# Patient Record
Sex: FEMALE | Race: WHITE | Employment: OTHER | URBAN - METROPOLITAN AREA
[De-identification: names, ages, dates, MRNs, and addresses within clinical notes are randomized per-mention and may not be internally consistent; named-entity substitution may affect disease eponyms.]

---

## 2018-08-23 ENCOUNTER — HOSPITAL ENCOUNTER (EMERGENCY)
Age: 83
Discharge: HOME | End: 2018-08-23
Attending: STUDENT IN AN ORGANIZED HEALTH CARE EDUCATION/TRAINING PROGRAM
Payer: MEDICARE

## 2018-08-23 ENCOUNTER — APPOINTMENT (OUTPATIENT)
Dept: GENERAL RADIOLOGY | Age: 83
End: 2018-08-23
Attending: STUDENT IN AN ORGANIZED HEALTH CARE EDUCATION/TRAINING PROGRAM
Payer: MEDICARE

## 2018-08-23 VITALS
TEMPERATURE: 98 F | HEART RATE: 89 BPM | DIASTOLIC BLOOD PRESSURE: 62 MMHG | BODY MASS INDEX: 24.66 KG/M2 | SYSTOLIC BLOOD PRESSURE: 121 MMHG | RESPIRATION RATE: 16 BRPM | OXYGEN SATURATION: 98 % | WEIGHT: 134 LBS | HEIGHT: 62 IN

## 2018-08-23 DIAGNOSIS — M54.50 ACUTE MIDLINE LOW BACK PAIN WITHOUT SCIATICA: Primary | ICD-10-CM

## 2018-08-23 PROCEDURE — 72100 X-RAY EXAM L-S SPINE 2/3 VWS: CPT

## 2018-08-23 PROCEDURE — 99282 EMERGENCY DEPT VISIT SF MDM: CPT

## 2018-08-23 PROCEDURE — 73502 X-RAY EXAM HIP UNI 2-3 VIEWS: CPT

## 2018-08-23 RX ORDER — METHYLPREDNISOLONE 4 MG/1
TABLET ORAL
Qty: 1 DOSE PACK | Refills: 0 | Status: SHIPPED | OUTPATIENT
Start: 2018-08-23

## 2018-08-23 RX ORDER — IBUPROFEN 600 MG/1
600 TABLET ORAL
Qty: 20 TAB | Refills: 0 | Status: SHIPPED | OUTPATIENT
Start: 2018-08-23

## 2018-08-23 NOTE — ED TRIAGE NOTES
Triage note: Patient arrives with  c/o lower back pain beginning Sunday. Denies injury/trauma. [facility], prescribed medications, specifics unknown, patient reports completing regimen with reoccurring pain. Patient denies bowel or bladder incontinence. Denies numbness or tingling in LE.

## 2018-08-23 NOTE — DISCHARGE INSTRUCTIONS
Learning About How to Have a Healthy Back  What causes back pain? Back pain is often caused by overuse, strain, or injury. For example, people often hurt their backs playing sports or working in the yard, being jolted in a car accident, or lifting something too heavy. Aging plays a part too. Your bones and muscles tend to lose strength as you age, which makes injury more likely. The spongy discs between the bones of the spine (vertebrae) may suffer from wear and tear and no longer provide enough cushion between the bones. A disc that bulges or breaks open (herniated disc) can press on nerves, causing back pain. In some people, back pain is the result of arthritis, broken vertebrae caused by bone loss (osteoporosis), illness, or a spine problem. Although most people have back pain at one time or another, there are steps you can take to make it less likely. How can you have a healthy back? Reduce stress on your back through good posture  Slumping or slouching alone may not cause low back pain. But after the back has been strained or injured, bad posture can make pain worse. · Sleep in a position that maintains your back's normal curves and on a mattress that feels comfortable. Sleep on your side with a pillow between your knees, or sleep on your back with a pillow under your knees. These positions can reduce strain on your back. · Stand and sit up straight. \"Good posture\" generally means your ears, shoulders, and hips are in a straight line. · If you must stand for a long time, put one foot on a stool, ledge, or box. Switch feet every now and then. · Sit in a chair that is low enough to let you place both feet flat on the floor with both knees nearly level with your hips. If your chair or desk is too high, use a footrest to raise your knees. Place a small pillow, a rolled-up towel, or a lumbar roll in the curve of your back if you need extra support.   · Try a kneeling chair, which helps tilt your hips forward. This takes pressure off your lower back. · Try sitting on an exercise ball. It can rock from side to side, which helps keep your back loose. · When driving, keep your knees nearly level with your hips. Sit straight, and drive with both hands on the steering wheel. Your arms should be in a slightly bent position. Reduce stress on your back through careful lifting  · Squat down, bending at the hips and knees only. If you need to, put one knee to the floor and extend your other knee in front of you, bent at a right angle (half kneeling). · Press your chest straight forward. This helps keep your upper back straight while keeping a slight arch in your low back. · Hold the load as close to your body as possible, at the level of your belly button (navel). · Use your feet to change direction, taking small steps. · Lead with your hips as you change direction. Keep your shoulders in line with your hips as you move. · Set down your load carefully, squatting with your knees and hips only. Exercise and stretch your back  · Do some exercise on most days of the week, if your doctor says it is okay. You can walk, run, swim, or cycle. · Stretch your back muscles. Here are a few exercises to try:  Denisa Gal on your back, and gently pull one bent knee to your chest. Put that foot back on the floor, and then pull the other knee to your chest.  ¨ Do pelvic tilts. Lie on your back with your knees bent. Tighten your stomach muscles. Pull your belly button (navel) in and up toward your ribs. You should feel like your back is pressing to the floor and your hips and pelvis are slightly lifting off the floor. Hold for 6 seconds while breathing smoothly. ¨ Sit with your back flat against a wall. · Keep your core muscles strong. The muscles of your back, belly (abdomen), and buttocks support your spine. ¨ Pull in your belly and imagine pulling your navel toward your spine. Hold this for 6 seconds, then relax.  Remember to keep breathing normally as you tense your muscles. ¨ Do curl-ups. Always do them with your knees bent. Keep your low back on the floor, and curl your shoulders toward your knees using a smooth, slow motion. Keep your arms folded across your chest. If this bothers your neck, try putting your hands behind your neck (not your head), with your elbows spread apart. ¨ Lie on your back with your knees bent and your feet flat on the floor. Tighten your belly muscles, and then push with your feet and raise your buttocks up a few inches. Hold this position 6 seconds as you continue to breathe normally, then lower yourself slowly to the floor. Repeat 8 to 12 times. ¨ If you like group exercise, try Pilates or yoga. These classes have poses that strengthen the core muscles. Lead a healthy lifestyle  · Stay at a healthy weight to avoid strain on your back. · Do not smoke. Smoking increases the risk of osteoporosis, which weakens the spine. If you need help quitting, talk to your doctor about stop-smoking programs and medicines. These can increase your chances of quitting for good. Where can you learn more? Go to http://karlene-jarod.info/. Enter L315 in the search box to learn more about \"Learning About How to Have a Healthy Back. \"  Current as of: November 29, 2017  Content Version: 11.7  © 5122-7265 [employer]. Care instructions adapted under license by [employer] (which disclaims liability or warranty for this information). If you have questions about a medical condition or this instruction, always ask your healthcare professional. [place] [address] any warranty or liability for your use of this information.

## 2018-08-23 NOTE — ED PROVIDER NOTES
HPI Comments: 81 y.o. female with past medical history significant for CVA, diabetes who presents with chief complaint of lower back pain. Patient reports 4 days of lower back pain that started after falling backward in the bathtub. Patient [facility] ago, had no imaging, and was prescribed \"pain pills\" without relief. Patient states pain is worse with movement and walking; she states she is unable to ambulate due to pain. Patient has no prior history of similar pain. No lower extremity weakness or numbness. No incontinence. There are no other acute medical concerns at this time. Social hx: Nonsmoker, no EtOH, no illicit drug use. PCP: Teri Larry MD    Note written by Erin Miranda, as dictated by Valerie Nj MD 9:09 AM      The history is provided by the patient. Past Medical History:   Diagnosis Date    [facility] ([facility]     Hypercholesteremia     Hypertension     [facility]       Past Surgical History:   Procedure Laterality Date    HX CHOLECYSTECTOMY           Family History:   Problem Relation Age of Onset    Arthritis-osteo Father        Social History     Social History    Marital status:      Spouse name: N/A    Number of children: N/A    Years of education: N/A     Occupational History    Not on file. Social History Main Topics    Smoking status: Never Smoker    Smokeless tobacco: Never Used    Alcohol use No    Drug use: No    Sexual activity: Not on file     Other Topics Concern    Not on file     Social History Narrative         ALLERGIES: Pcn [penicillins]    Review of Systems   Constitutional: Negative for activity change, diaphoresis, fatigue and fever. HENT: Negative for congestion and sore throat. Eyes: Negative for photophobia and visual disturbance. Respiratory: Negative for chest tightness and shortness of breath.     Cardiovascular: Negative for chest pain, palpitations and leg swelling. Gastrointestinal: Negative for abdominal pain, blood in stool, constipation, diarrhea, nausea and vomiting. Genitourinary: Negative for difficulty urinating, dysuria, flank pain, frequency and hematuria. Musculoskeletal: Positive for back pain. Neurological: Negative for dizziness, syncope, numbness and headaches. All other systems reviewed and are negative. Vitals:    08/23/18 0844   BP: 129/64   Pulse: 94   Resp: 18   Temp: 97.8 °F (36.6 °C)   SpO2: 98%   Weight: 60.8 kg (134 lb)   Height: 5' 2\" (1.575 m)            Physical Exam   Constitutional: She is oriented to person, place, and time. She appears well-developed and well-nourished. No distress. HENT:   Head: Normocephalic and atraumatic. Nose: Nose normal.   Mouth/Throat: Oropharynx is clear and moist. No oropharyngeal exudate. Eyes: Conjunctivae and EOM are normal. Right eye exhibits no discharge. Left eye exhibits no discharge. No scleral icterus. Neck: Normal range of motion. Neck supple. No JVD present. No tracheal deviation present. No thyromegaly present. Cardiovascular: Normal rate, regular rhythm, normal heart sounds and intact distal pulses. Exam reveals no gallop and no friction rub. No murmur heard. Pulmonary/Chest: Effort normal and breath sounds normal. No stridor. No respiratory distress. She has no wheezes. She has no rales. She exhibits no tenderness. Abdominal: Bowel sounds are normal. She exhibits no distension and no mass. There is no tenderness. There is no rebound. Musculoskeletal: Normal range of motion. She exhibits tenderness (sacral tenderness). She exhibits no edema. Lymphadenopathy:     She has no cervical adenopathy. Neurological: She is alert and oriented to person, place, and time. No cranial nerve deficit. Coordination normal.   Skin: Skin is warm and dry. No rash noted. She is not diaphoretic. No erythema. No pallor. Psychiatric: She has a normal mood and affect.  Her behavior is normal. Judgment and thought content normal.   Nursing note and vitals reviewed. Note written by Scott Choi. Masoud Edwards, as dictated by Michelle Siddiqui MD 9:13 AM       MDM  Number of Diagnoses or Management Options  Acute midline low back pain without sciatica:   Diagnosis management comments: Pelvic fracture, lumbar fracture, lumbar strain, sacral contusion. 7year-old female presenting after having a fall in her bathtub striking her lower back and sacral region no obvious deformity or ecchymosis noted tender to touch. Patient was seen and evaluated at [facility] states that imaging was not obtained. Plan: X-ray lumbar, x-ray pelvis patient currently pain-free while supine. Reassessment:  Pelvis unremarkable lumbar spine shows disc space narrowing at L3-L4 L4-L5 and L5-S1 with posterior facet arthrosis negative for fracture. Will place patient on Medrol Dosepak and NSAIDs with PCP and/or ortho follow up. Amount and/or Complexity of Data Reviewed  Tests in the radiology section of CPT®: ordered and reviewed  Review and summarize past medical records: yes  Independent visualization of images, tracings, or specimens: yes    Risk of Complications, Morbidity, and/or Mortality  Presenting problems: moderate  Diagnostic procedures: moderate  Management options: moderate    Patient Progress  Patient progress: stable        ED Course       Procedures    10:40 AM  Plan to discharge on Motrin and Medrol dosepack with PCP follow up.    7:22 AM  The patient has been reevaluated. The patient is ready for discharge. The patient's signs, symptoms, diagnosis, and discharge instructions have been discussed and the patient/ family has conveyed their understanding. The patient is to follow up as recommended or return to the ED should their symptoms worsen. Plan has been discussed and the patient is in agreement.     LABORATORY TESTS:  No results found for this or any previous visit (from the past 12 hour(s)). IMAGING RESULTS:  XR HIP RT W OR WO PELV 2-3 VWS   Final Result      XR SPINE LUMB 2 OR 3 V   Final Result        Xr Spine Lumb 2 Or 3 V    Result Date: 8/23/2018  EXAM:  XR SPINE LUMB 2 OR 3 V INDICATION: Evaluate for back pain. COMPARISON: None. FINDINGS: AP, lateral and spot lateral views of the lumbar spine demonstrate levoconvex scoliosis with 6 mm of anterolisthesis at L4-L5. There is disc space narrowing at L3-L4, L4-L5 and L5-S1 with posterior facet arthrosis. The vertebral body heights are preserved. There is no fracture or other acute abnormality. IMPRESSION: Levoconvex lumbar scoliosis and spondylosis with grade 1 L4-L5 anterolisthesis. Xr Hip Rt W Or Wo Pelv 2-3 Vws    Result Date: 8/23/2018  EXAM:  XR HIP RT W OR WO PELV 2-3 VWS INDICATION: Right hip pain after fall. COMPARISON: None. FINDINGS: An AP view of the pelvis and a frogleg lateral view of the right hip demonstrate no fracture, dislocation or other acute abnormality. There is degenerative change of the lumbar spine. There are vascular calcifications. The frog-leg view is suboptimal.     IMPRESSION: Normal right hip. MEDICATIONS GIVEN:  Medications - No data to display    IMPRESSION:  1. Acute midline low back pain without sciatica        PLAN:  1.    Discharge Medication List as of 8/23/2018 10:39 AM      START taking these medications    Details   ibuprofen (MOTRIN) 600 mg tablet Take 1 Tab by mouth every six (6) hours as needed for Pain., Print, Disp-20 Tab, R-0      methylPREDNISolone (MEDROL DOSEPACK) 4 mg tablet Take per package insert, Print, Disp-1 Dose Pack, R-0         CONTINUE these medications which have NOT CHANGED    Details   pantoprazole (PROTONIX) 40 mg tablet TAKE ONE TABLET BY MOUTH EVERY DAY, Print, Disp-30 Tab, R-11      !! metFORMIN (GLUCOPHAGE) 1,000 mg tablet TAKE ONE TABLET BY MOUTH TWICE DAILY - DUE FOR FASTING OFFICE VISIT, Print, Disp-60 Tab, R-0      guaiFENesin (ROBITUSSIN) 100 mg/5 mL liquid Take 10 mL by mouth every four (4) hours as needed for Cough. Normal, 200 mg, Disp-1 Bottle, R-0      ferrous sulfate 325 mg (65 mg iron) tablet Take 1 Tab by mouth Daily (before breakfast). Normal, 325 mg, Disp-30 Tab, R-0      polyethylene glycol (MIRALAX) 17 gram packet Take 1 Packet by mouth as needed. Normal, 17 g, Disp-15 Each, R-0      oxyCODONE-acetaminophen (PERCOCET) 5-325 mg per tablet Take 1 Tab by mouth every six (6) hours as needed for Pain. Print, 1 Tab, Disp-20 Tab, R-0      carvedilol (COREG) 6.25 mg tablet Take 1 Tab by mouth every twelve (12) hours. Print, 6.25 mg, Disp-30 Tab, R-0      diltiazem CD (CARDIZEM CD) 120 mg ER capsule Take 1 Cap by mouth daily. Print, 120 mg, Disp-30 Cap, R-0      lisinopril (PRINIVIL, ZESTRIL) 10 mg tablet Take 10 mg by mouth daily. Historical Med, 10 mg      simvastatin (ZOCOR) 40 mg tablet Take 1 Tab by mouth nightly. Normal, 40 mg, Disp-30 Tab, R-3      glipiZIDE (GLUCOTROL) 5 mg tablet TAKE ONE TABLET BY MOUTH TWICE DAILYNormal, Disp-180 Tab, R-0      !! metFORMIN (GLUCOPHAGE) 1,000 mg tablet Take 1 Tab by mouth two (2) times daily (with meals). Normal, 1,000 mg, Disp-60 Tab, R-6      multivitamins-minerals-lutein (CENTRUM SILVER) Tab Take  by mouth. Historical Med      aspirin delayed-release 81 mg tablet Take  by mouth daily. Historical Med       !! - Potential duplicate medications found. Please discuss with provider.         2.   Follow-up Information     Follow up With Details Comments Contact Info    Tez Loza MD  If symptoms worsen [address] [place] [id number] [phone]      [facility] [place] [facility] [address]  [phone]            Return to ED for new or worsening symptoms       Mohit Anthony MD

## 2019-07-29 ENCOUNTER — HOSPITAL ENCOUNTER (EMERGENCY)
Age: 84
Discharge: HOME | End: 2019-07-29
Attending: STUDENT IN AN ORGANIZED HEALTH CARE EDUCATION/TRAINING PROGRAM
Payer: MEDICARE

## 2019-07-29 VITALS
HEART RATE: 89 BPM | RESPIRATION RATE: 16 BRPM | BODY MASS INDEX: 24.51 KG/M2 | OXYGEN SATURATION: 100 % | HEIGHT: 62 IN | SYSTOLIC BLOOD PRESSURE: 151 MMHG | DIASTOLIC BLOOD PRESSURE: 66 MMHG | TEMPERATURE: 97.5 F

## 2019-07-29 DIAGNOSIS — R10.13 ABDOMINAL PAIN, EPIGASTRIC: ICD-10-CM

## 2019-07-29 DIAGNOSIS — R19.7 NAUSEA VOMITING AND DIARRHEA: Primary | ICD-10-CM

## 2019-07-29 DIAGNOSIS — R11.2 NAUSEA VOMITING AND DIARRHEA: Primary | ICD-10-CM

## 2019-07-29 LAB
ALBUMIN SERPL-MCNC: 3.9 G/DL (ref 3.5–5)
ALBUMIN/GLOB SERPL: 1 {RATIO} (ref 1.1–2.2)
ALP SERPL-CCNC: 118 U/L (ref 45–117)
ALT SERPL-CCNC: 13 U/L (ref 12–78)
ANION GAP SERPL CALC-SCNC: 7 MMOL/L (ref 5–15)
AST SERPL-CCNC: 17 U/L (ref 15–37)
BASOPHILS # BLD: 0 K/UL (ref 0–0.1)
BASOPHILS NFR BLD: 1 % (ref 0–1)
BILIRUB SERPL-MCNC: 0.3 MG/DL (ref 0.2–1)
BUN SERPL-MCNC: 15 MG/DL (ref 6–20)
BUN/CREAT SERPL: 21 (ref 12–20)
CALCIUM SERPL-MCNC: 9.8 MG/DL (ref 8.5–10.1)
CHLORIDE SERPL-SCNC: 104 MMOL/L (ref 97–108)
CO2 SERPL-SCNC: 28 MMOL/L (ref 21–32)
COMMENT, HOLDF: NORMAL
CREAT SERPL-MCNC: 0.71 MG/DL (ref 0.55–1.02)
DIFFERENTIAL METHOD BLD: ABNORMAL
EOSINOPHIL # BLD: 0.2 K/UL (ref 0–0.4)
EOSINOPHIL NFR BLD: 2 % (ref 0–7)
ERYTHROCYTE [DISTWIDTH] IN BLOOD BY AUTOMATED COUNT: 14.7 % (ref 11.5–14.5)
GLOBULIN SER CALC-MCNC: 3.8 G/DL (ref 2–4)
GLUCOSE SERPL-MCNC: 146 MG/DL (ref 65–100)
HCT VFR BLD AUTO: 42 % (ref 35–47)
HGB BLD-MCNC: 12.3 G/DL (ref 11.5–16)
IMM GRANULOCYTES # BLD AUTO: 0 K/UL (ref 0–0.04)
IMM GRANULOCYTES NFR BLD AUTO: 0 % (ref 0–0.5)
LIPASE SERPL-CCNC: 61 U/L (ref 73–393)
LYMPHOCYTES # BLD: 1.6 K/UL (ref 0.8–3.5)
LYMPHOCYTES NFR BLD: 21 % (ref 12–49)
MAGNESIUM SERPL-MCNC: 1.6 MG/DL (ref 1.6–2.4)
MCH RBC QN AUTO: 25.7 PG (ref 26–34)
MCHC RBC AUTO-ENTMCNC: 29.3 G/DL (ref 30–36.5)
MCV RBC AUTO: 87.9 FL (ref 80–99)
MONOCYTES # BLD: 0.5 K/UL (ref 0–1)
MONOCYTES NFR BLD: 7 % (ref 5–13)
NEUTS SEG # BLD: 5.4 K/UL (ref 1.8–8)
NEUTS SEG NFR BLD: 69 % (ref 32–75)
NRBC # BLD: 0 K/UL (ref 0–0.01)
NRBC BLD-RTO: 0 PER 100 WBC
PLATELET # BLD AUTO: 264 K/UL (ref 150–400)
PMV BLD AUTO: 10.2 FL (ref 8.9–12.9)
POTASSIUM SERPL-SCNC: 3.7 MMOL/L (ref 3.5–5.1)
PROT SERPL-MCNC: 7.7 G/DL (ref 6.4–8.2)
RBC # BLD AUTO: 4.78 M/UL (ref 3.8–5.2)
SAMPLES BEING HELD,HOLD: NORMAL
SODIUM SERPL-SCNC: 139 MMOL/L (ref 136–145)
WBC # BLD AUTO: 7.8 K/UL (ref 3.6–11)

## 2019-07-29 PROCEDURE — 99284 EMERGENCY DEPT VISIT MOD MDM: CPT

## 2019-07-29 PROCEDURE — 96361 HYDRATE IV INFUSION ADD-ON: CPT

## 2019-07-29 PROCEDURE — 83690 ASSAY OF LIPASE: CPT

## 2019-07-29 PROCEDURE — 36415 COLL VENOUS BLD VENIPUNCTURE: CPT

## 2019-07-29 PROCEDURE — 96374 THER/PROPH/DIAG INJ IV PUSH: CPT

## 2019-07-29 PROCEDURE — 80053 COMPREHEN METABOLIC PANEL: CPT

## 2019-07-29 PROCEDURE — 93005 ELECTROCARDIOGRAM TRACING: CPT

## 2019-07-29 PROCEDURE — 74011000250 HC RX REV CODE- 250: Performed by: STUDENT IN AN ORGANIZED HEALTH CARE EDUCATION/TRAINING PROGRAM

## 2019-07-29 PROCEDURE — 96375 TX/PRO/DX INJ NEW DRUG ADDON: CPT

## 2019-07-29 PROCEDURE — 85025 COMPLETE CBC W/AUTO DIFF WBC: CPT

## 2019-07-29 PROCEDURE — 74011250636 HC RX REV CODE- 250/636: Performed by: STUDENT IN AN ORGANIZED HEALTH CARE EDUCATION/TRAINING PROGRAM

## 2019-07-29 PROCEDURE — 83735 ASSAY OF MAGNESIUM: CPT

## 2019-07-29 RX ORDER — SODIUM CHLORIDE 9 MG/ML
1000 INJECTION, SOLUTION INTRAVENOUS ONCE
Status: COMPLETED | OUTPATIENT
Start: 2019-07-29 | End: 2019-07-29

## 2019-07-29 RX ORDER — ONDANSETRON 2 MG/ML
4 INJECTION INTRAMUSCULAR; INTRAVENOUS
Status: COMPLETED | OUTPATIENT
Start: 2019-07-29 | End: 2019-07-29

## 2019-07-29 RX ORDER — ONDANSETRON 4 MG/1
4 TABLET, FILM COATED ORAL
Qty: 8 TAB | Refills: 0 | Status: SHIPPED | OUTPATIENT
Start: 2019-07-29

## 2019-07-29 RX ADMIN — FAMOTIDINE 20 MG: 10 INJECTION, SOLUTION INTRAVENOUS at 17:26

## 2019-07-29 RX ADMIN — ONDANSETRON 4 MG: 2 INJECTION INTRAMUSCULAR; INTRAVENOUS at 17:26

## 2019-07-29 RX ADMIN — SODIUM CHLORIDE 1000 ML: 900 INJECTION, SOLUTION INTRAVENOUS at 17:26

## 2019-07-29 NOTE — ED PROVIDER NOTES
The patient is an 78-year-old female with a prior history of type 1 diabetes, hyperlipidemia, hypertension and prior stroke as well as cholecystectomy presenting to the emergency department today secondary to nausea, vomiting, and diarrhea. Patient reports 4 days of symptoms that started after eating at a seafood restaurant. .  She has had 4 episodes of vomiting per day as well as to 3 episodes of diarrhea per day. She describes the emesis as \"hot water\" with small specks of blood in at times. The diarrhea has been watery and without blood. She describes an aching epigastric pain that does not radiate. No lower abdominal pain. Normal urine output without hematuria, dysuria or frequency. No back pain or headache. No fever chills. No chest pain. She gets some dizziness and shortness of breath when walking. She lives with her  who has not been ill. No recent travel outside of the country. No pets at home. Social: Denies tobacco,  drug or alcohol use. Past Medical History:   Diagnosis Date    [facility] ([facility]     Hypercholesteremia     Hypertension     Stroke ([facility] 3/6/2011       Past Surgical History:   Procedure Laterality Date    HX CHOLECYSTECTOMY           Family History:   Problem Relation Age of Onset    Arthritis-osteo Father          ALLERGIES: Pcn [penicillins]    Review of Systems   Constitutional: Negative for chills, fatigue and fever. HENT: Negative for congestion and rhinorrhea. Eyes: Negative for pain, redness and visual disturbance. Respiratory: Positive for shortness of breath. Negative for cough. Cardiovascular: Negative for chest pain and leg swelling. Gastrointestinal: Positive for abdominal pain, diarrhea, nausea and vomiting. Genitourinary: Negative for dysuria, flank pain, frequency, hematuria and urgency. Musculoskeletal: Negative for arthralgias, back pain, myalgias and neck pain. Skin: Negative for rash and wound. Allergic/Immunologic: Negative for immunocompromised state. Neurological: Positive for dizziness. Negative for headaches. Vitals:    07/29/19 1604   Pulse: (!) 114   SpO2: 97%            Physical Exam   Constitutional: She is oriented to person, place, and time. She appears well-developed and well-nourished. No distress. HENT:   Head: Normocephalic. Mouth/Throat: No oropharyngeal exudate. Dry mucous membranes   Eyes: Pupils are equal, round, and reactive to light. EOM are normal. Right eye exhibits no discharge. Left eye exhibits no discharge. Neck: Normal range of motion. Neck supple. Cardiovascular: Regular rhythm, normal heart sounds and intact distal pulses. Exam reveals no gallop and no friction rub. No murmur heard. tachycardic   Pulmonary/Chest: Effort normal and breath sounds normal. No stridor. No respiratory distress. She has no wheezes. She has no rales. Abdominal: Soft. Bowel sounds are normal. She exhibits no distension. There is tenderness (mild in epigastrium). There is no rebound and no guarding. Musculoskeletal: Normal range of motion. She exhibits no edema or deformity. Neurological: She is alert and oriented to person, place, and time. Skin: Skin is warm and dry. Capillary refill takes less than 2 seconds. No rash noted. She is not diaphoretic. Psychiatric: She has a normal mood and affect. Her behavior is normal.   Nursing note and vitals reviewed. EKG Interpretation:   NSR rate of 79, normal axis, slightly prolonged QTc, non specific STT wave changes       Labs Reviewed:   No leukocytosis or anemia  Normal sodium, potassium  Mild hyperglycemia at 146  Her creatinine is 0.71 which is up from baseline of 0.2-0.3 (six years ago)  LFT not c/w biliary obstructive process or hepatitis  Lipase not c/w pancreatitis  Magnesium WNL      Course:  Zofran 4mg IV + Pepcid 20mg IV + 1L NSS IV given    6:07 PM re-evaluated. Patient feeling much better. HR improved to 78. No acute distress. Abdominal exam without tenderness, no c/o nausea or pain at this time. Will continue with IVF and re-assess with PO challenge after. 7:15 PM patient was re-evaluated. Abdominal exam without any tenderness. VS stable. Feeling well and wanting to go home. Tolerated water and crackers. MDM:  78-year-old female here with 4 days of nausea, vomiting, diarrhea and epigastric discomfort. On arrival she was tachycardic which improved with 1 L of IV fluids. Her abdominal exam was initially with some tenderness in the epigastrium but improved with Pepcid and Zofran. On serial reexaminations she had no tenderness. No vomiting or diarrhea while in the department. Overall she is very well-appearing in no acute distress. She was able to take p.o. in the emergency department. Labs not consistent with pancreatitis, cholecystitis, hepatitis, or significant electrolyte abnormality. I am not consistent with appendicitis or small bowel obstruction. History not consistent with mesenteric ischemia. EKG showed no ischemic changes. Given her overall well appearance and ability to tolerate p.o. I feel she is stable for discharge home. Symptoms could be related to food poisoning versus gastroenteritis. Clinical Impression:     ICD-10-CM ICD-9-CM    1. Nausea vomiting and diarrhea R11.2 787.91     R19.7 787.01    2.  Abdominal pain, epigastric R10.13 789.06            Disposition: DC home    [address] [facility] DO Elizabeth

## 2019-07-29 NOTE — ED NOTES
Discharge instructions reviewed by provider; pt verbalized understanding of discharge and medication use. Vitals updated, IV DC'ed and pt wheeled from ED to family car. No apparent distress noted.

## 2019-07-29 NOTE — DISCHARGE INSTRUCTIONS
PLEASE RETURN IF YOU DEVELOP WORSENING ABDOMINAL PAIN OR IF IT MOVES TO YOUR LOWER ABDOMEN    RETURN IF YOU ARE UNABLE TO TOLERATE ANYTHING BY MOUTH    TAKE THE ZOFRAN AS NEEDED EVERY 8 HOURS FOR NAUSEA

## 2019-07-30 LAB
ATRIAL RATE: 79 BPM
CALCULATED P AXIS, ECG09: 30 DEGREES
CALCULATED R AXIS, ECG10: 50 DEGREES
CALCULATED T AXIS, ECG11: 11 DEGREES
DIAGNOSIS, 93000: NORMAL
P-R INTERVAL, ECG05: 174 MS
Q-T INTERVAL, ECG07: 412 MS
QRS DURATION, ECG06: 84 MS
QTC CALCULATION (BEZET), ECG08: 472 MS
VENTRICULAR RATE, ECG03: 79 BPM

## 2022-11-09 ENCOUNTER — HOSPITAL ENCOUNTER (EMERGENCY)
Age: 87
Discharge: HOME | End: 2022-11-09
Attending: EMERGENCY MEDICINE
Payer: MEDICARE

## 2022-11-09 ENCOUNTER — APPOINTMENT (OUTPATIENT)
Dept: CT IMAGING | Age: 87
End: 2022-11-09
Attending: STUDENT IN AN ORGANIZED HEALTH CARE EDUCATION/TRAINING PROGRAM
Payer: MEDICARE

## 2022-11-09 VITALS
DIASTOLIC BLOOD PRESSURE: 81 MMHG | OXYGEN SATURATION: 98 % | TEMPERATURE: 98.1 F | HEART RATE: 77 BPM | SYSTOLIC BLOOD PRESSURE: 174 MMHG | RESPIRATION RATE: 20 BRPM

## 2022-11-09 DIAGNOSIS — R10.13 ABDOMINAL PAIN, EPIGASTRIC: Primary | ICD-10-CM

## 2022-11-09 DIAGNOSIS — K59.00 CONSTIPATION, UNSPECIFIED CONSTIPATION TYPE: ICD-10-CM

## 2022-11-09 LAB
ALBUMIN SERPL-MCNC: 3.6 G/DL (ref 3.5–5)
ALBUMIN/GLOB SERPL: 0.9 {RATIO} (ref 1.1–2.2)
ALP SERPL-CCNC: 120 U/L (ref 45–117)
ALT SERPL-CCNC: 17 U/L (ref 12–78)
ANION GAP SERPL CALC-SCNC: 5 MMOL/L (ref 5–15)
AST SERPL-CCNC: ABNORMAL U/L (ref 15–37)
BASOPHILS # BLD: 0 K/UL (ref 0–0.1)
BASOPHILS NFR BLD: 0 % (ref 0–1)
BILIRUB SERPL-MCNC: 0.5 MG/DL (ref 0.2–1)
BUN SERPL-MCNC: 11 MG/DL (ref 6–20)
BUN/CREAT SERPL: 17 (ref 12–20)
CALCIUM SERPL-MCNC: 9.7 MG/DL (ref 8.5–10.1)
CHLORIDE SERPL-SCNC: 106 MMOL/L (ref 97–108)
CO2 SERPL-SCNC: 26 MMOL/L (ref 21–32)
COMMENT, HOLDF: NORMAL
CREAT SERPL-MCNC: 0.66 MG/DL (ref 0.55–1.02)
DIFFERENTIAL METHOD BLD: NORMAL
EOSINOPHIL # BLD: 0.1 K/UL (ref 0–0.4)
EOSINOPHIL NFR BLD: 2 % (ref 0–7)
ERYTHROCYTE [DISTWIDTH] IN BLOOD BY AUTOMATED COUNT: 14.1 % (ref 11.5–14.5)
GLOBULIN SER CALC-MCNC: 4.1 G/DL (ref 2–4)
GLUCOSE SERPL-MCNC: 163 MG/DL (ref 65–100)
HCT VFR BLD AUTO: 45.6 % (ref 35–47)
HGB BLD-MCNC: 14.5 G/DL (ref 11.5–16)
IMM GRANULOCYTES # BLD AUTO: 0 K/UL (ref 0–0.04)
IMM GRANULOCYTES NFR BLD AUTO: 0 % (ref 0–0.5)
LIPASE SERPL-CCNC: 42 U/L (ref 73–393)
LYMPHOCYTES # BLD: 1.3 K/UL (ref 0.8–3.5)
LYMPHOCYTES NFR BLD: 18 % (ref 12–49)
MCH RBC QN AUTO: 28.3 PG (ref 26–34)
MCHC RBC AUTO-ENTMCNC: 31.8 G/DL (ref 30–36.5)
MCV RBC AUTO: 88.9 FL (ref 80–99)
MONOCYTES # BLD: 0.5 K/UL (ref 0–1)
MONOCYTES NFR BLD: 8 % (ref 5–13)
NEUTS SEG # BLD: 5 K/UL (ref 1.8–8)
NEUTS SEG NFR BLD: 72 % (ref 32–75)
NRBC # BLD: 0 K/UL (ref 0–0.01)
NRBC BLD-RTO: 0 PER 100 WBC
PLATELET # BLD AUTO: 231 K/UL (ref 150–400)
PMV BLD AUTO: 10.9 FL (ref 8.9–12.9)
POTASSIUM SERPL-SCNC: ABNORMAL MMOL/L (ref 3.5–5.1)
PROT SERPL-MCNC: 7.7 G/DL (ref 6.4–8.2)
RBC # BLD AUTO: 5.13 M/UL (ref 3.8–5.2)
SAMPLES BEING HELD,HOLD: NORMAL
SODIUM SERPL-SCNC: 137 MMOL/L (ref 136–145)
WBC # BLD AUTO: 6.9 K/UL (ref 3.6–11)

## 2022-11-09 PROCEDURE — 83690 ASSAY OF LIPASE: CPT

## 2022-11-09 PROCEDURE — 80053 COMPREHEN METABOLIC PANEL: CPT

## 2022-11-09 PROCEDURE — 36415 COLL VENOUS BLD VENIPUNCTURE: CPT

## 2022-11-09 PROCEDURE — 85025 COMPLETE CBC W/AUTO DIFF WBC: CPT

## 2022-11-09 PROCEDURE — 74177 CT ABD & PELVIS W/CONTRAST: CPT

## 2022-11-09 PROCEDURE — 99285 EMERGENCY DEPT VISIT HI MDM: CPT

## 2022-11-09 PROCEDURE — 74011000636 HC RX REV CODE- 636: Performed by: RADIOLOGY

## 2022-11-09 PROCEDURE — 93005 ELECTROCARDIOGRAM TRACING: CPT

## 2022-11-09 RX ADMIN — IOPAMIDOL 100 ML: 755 INJECTION, SOLUTION INTRAVENOUS at 18:08

## 2022-11-09 NOTE — ED TRIAGE NOTES
Pt referred by [facility] for upper abd pain x1 month. Pt reports pain has increased starting Sunday. +nausea.

## 2022-11-09 NOTE — ED PROVIDER NOTES
52-year-old female with history of T1DM, HLD, HTN and stroke presents to ED with 3 days of upper abdominal pain. Patient reports over the past month she has had upper abdominal pain that seems to be worsening in the past couple days. She reports that 3 days ago she had been constipated so took some milk of magnesia. She reports that she later had diarrhea and felt better but then the pain returned. She also notes associated nausea but has not vomited. [facility] CXR and KUB X-ray and they were concerned about possible SBO. She notes that she has an appointment with her PCP next week but felt like she couldn't wait. She has been having diarrhea for the past month secondary to being put on metformin. Denies any fevers, chills, vomiting, dysuria, urinary symptoms. The history is provided by the patient. Abdominal Pain   Associated symptoms include nausea. Pertinent negatives include no fever, no vomiting, no dysuria, no headaches, no myalgias and no chest pain.       Past Medical History:   Diagnosis Date    Diabetes mellitus type 1 ([facility] [address]     Hypercholesteremia     Hypertension     Stroke ([facility] 3/6/2011       Past Surgical History:   Procedure Laterality Date    HX CHOLECYSTECTOMY           Family History:   Problem Relation Age of Onset    OSTEOARTHRITIS Father        Social History     Socioeconomic History    Marital status:      Spouse name: Not on file    Number of children: Not on file    Years of education: Not on file    Highest education level: Not on file   Occupational History    Not on file   Tobacco Use    Smoking status: Never    Smokeless tobacco: Never   Substance and Sexual Activity    Alcohol use: No    Drug use: No    Sexual activity: Not on file   Other Topics Concern    Not on file   Social History Narrative    Not on file     Social Determinants of Health     Financial Resource Strain: Not on file   Food Insecurity: Not on file Transportation Needs: Not on file   Physical Activity: Not on file   Stress: Not on file   Social Connections: Not on file   Intimate Partner Violence: Not on file   Housing Stability: Not on file         ALLERGIES: Pcn [penicillins]    Review of Systems   Constitutional:  Negative for fever. HENT:  Negative for congestion and sinus pressure. Respiratory:  Negative for shortness of breath. Cardiovascular:  Negative for chest pain. Gastrointestinal:  Positive for abdominal pain and nausea. Negative for vomiting. Genitourinary:  Negative for dysuria. Musculoskeletal:  Negative for myalgias. Neurological:  Negative for dizziness and headaches. Hematological:  Negative for adenopathy. Psychiatric/Behavioral:  The patient is not nervous/anxious. All other systems reviewed and are negative. Vitals:    11/09/22 1327   BP: (!) 174/81   Pulse: 77   Resp: 20   Temp: 98.1 °F (36.7 °C)   SpO2: 98%            Physical Exam  Vitals and nursing note reviewed. Constitutional:       General: She is not in acute distress. Appearance: Normal appearance. She is normal weight. HENT:      Head: Normocephalic and atraumatic. Eyes:      Extraocular Movements: Extraocular movements intact. Pupils: Pupils are equal, round, and reactive to light. Cardiovascular:      Rate and Rhythm: Normal rate and regular rhythm. Heart sounds: Normal heart sounds. Pulmonary:      Breath sounds: Normal breath sounds. Abdominal:      Palpations: Abdomen is soft. Tenderness: There is no abdominal tenderness. Lymphadenopathy:      Cervical: No cervical adenopathy. Skin:     General: Skin is warm and dry. Neurological:      General: No focal deficit present. Mental Status: She is alert and oriented to person, place, and time. Psychiatric:         Mood and Affect: Mood normal.         Behavior: Behavior normal.         Thought Content:  Thought content normal.        MDM  Number of Diagnoses or Management Options  Abdominal pain, epigastric  Constipation, unspecified constipation type  Diagnosis management comments: 60-year-old female with history of T1DM, HLD, HTN and stroke presents to ED with 3 days of upper abdominal pain. Vital signs stable in triage and patient is afebrile. Physical exam unremarkable for any abdominal tenderness to palpation. Labs unremarkable with no elevation white blood cell count. EKG revealed rate of 77 bpm with normal interval, normal axis, normal sinus rhythm with sinus arrhythmia no ischemic changes. CT of abdomen pelvis shows diverticula without diverticulitis, no bowel obstruction and no other acute abnormalities. Encourage patient to give urine sample but patient refused reporting \"I just want to go home\". She has follow-up with her PCP in 4 days agreed and discharged with strict return precautions, conservative care and follow-up.        Amount and/or Complexity of Data Reviewed  Clinical lab tests: reviewed  Tests in the radiology section of CPT®: reviewed  Tests in the medicine section of CPT®: reviewed      ED Course as of 11/09/22 2036 Wed Nov 09, 2022   [address] Patient refused giving urine sample, would like to go home [AH]      ED Course User Index  [AH] Janak Hess       Procedures

## 2022-11-10 LAB
ATRIAL RATE: 77 BPM
CALCULATED P AXIS, ECG09: 52 DEGREES
CALCULATED R AXIS, ECG10: 78 DEGREES
CALCULATED T AXIS, ECG11: 21 DEGREES
DIAGNOSIS, 93000: NORMAL
P-R INTERVAL, ECG05: 166 MS
Q-T INTERVAL, ECG07: 390 MS
QRS DURATION, ECG06: 82 MS
QTC CALCULATION (BEZET), ECG08: 441 MS
VENTRICULAR RATE, ECG03: 77 BPM

## 2022-11-10 NOTE — DISCHARGE INSTRUCTIONS
Continue to monitor symptoms at home. Can also take 1 capful of Miralax three times per day. Stay hydrated and eat plenty of fiber including raw fruits, vegetables and whole grains. Return with any changes or worsening. Follow up with PCP and GI.

## 2025-03-15 ENCOUNTER — APPOINTMENT (OUTPATIENT)
Facility: HOSPITAL | Age: 89
DRG: 375 | End: 2025-03-15
Payer: MEDICARE

## 2025-03-15 ENCOUNTER — HOSPITAL ENCOUNTER (INPATIENT)
Facility: HOSPITAL | Age: 89
LOS: 2 days | Discharge: HOSPICE | DRG: 375 | End: 2025-03-17
Attending: EMERGENCY MEDICINE | Admitting: INTERNAL MEDICINE
Payer: MEDICARE

## 2025-03-15 DIAGNOSIS — G89.3 CANCER ASSOCIATED PAIN: ICD-10-CM

## 2025-03-15 DIAGNOSIS — R53.1 GENERAL WEAKNESS: ICD-10-CM

## 2025-03-15 DIAGNOSIS — R19.7 DIARRHEA, UNSPECIFIED TYPE: Primary | ICD-10-CM

## 2025-03-15 PROBLEM — K21.9 GERD (GASTROESOPHAGEAL REFLUX DISEASE): Status: ACTIVE | Noted: 2025-03-15

## 2025-03-15 PROBLEM — D50.9 MICROCYTIC ANEMIA: Status: ACTIVE | Noted: 2025-03-15

## 2025-03-15 PROBLEM — E87.6 HYPOKALEMIA DUE TO LOSS OF POTASSIUM: Status: ACTIVE | Noted: 2025-03-15

## 2025-03-15 LAB
ALBUMIN SERPL-MCNC: 2.3 G/DL (ref 3.5–5)
ALBUMIN/GLOB SERPL: 0.5 (ref 1.1–2.2)
ALP SERPL-CCNC: 98 U/L (ref 45–117)
ALT SERPL-CCNC: 13 U/L (ref 12–78)
ANION GAP SERPL CALC-SCNC: 10 MMOL/L (ref 2–12)
APPEARANCE UR: ABNORMAL
AST SERPL-CCNC: 31 U/L (ref 15–37)
BACTERIA URNS QL MICRO: ABNORMAL /HPF
BASOPHILS # BLD: 0.02 K/UL (ref 0–0.1)
BASOPHILS NFR BLD: 0.5 % (ref 0–1)
BILIRUB SERPL-MCNC: 0.2 MG/DL (ref 0.2–1)
BILIRUB UR QL: NEGATIVE
BUN SERPL-MCNC: 27 MG/DL (ref 6–20)
BUN/CREAT SERPL: 25 (ref 12–20)
CALCIUM SERPL-MCNC: 9.1 MG/DL (ref 8.5–10.1)
CHLORIDE SERPL-SCNC: 102 MMOL/L (ref 97–108)
CO2 SERPL-SCNC: 24 MMOL/L (ref 21–32)
COLOR UR: ABNORMAL
CREAT SERPL-MCNC: 1.06 MG/DL (ref 0.55–1.02)
DIFFERENTIAL METHOD BLD: ABNORMAL
EOSINOPHIL # BLD: 0.02 K/UL (ref 0–0.4)
EOSINOPHIL NFR BLD: 0.5 % (ref 0–7)
EPITH CASTS URNS QL MICRO: ABNORMAL /LPF
ERYTHROCYTE [DISTWIDTH] IN BLOOD BY AUTOMATED COUNT: 17.3 % (ref 11.5–14.5)
GLOBULIN SER CALC-MCNC: 4.2 G/DL (ref 2–4)
GLUCOSE BLD STRIP.AUTO-MCNC: 149 MG/DL (ref 65–117)
GLUCOSE SERPL-MCNC: 211 MG/DL (ref 65–100)
GLUCOSE UR STRIP.AUTO-MCNC: 100 MG/DL
HCT VFR BLD AUTO: 32.9 % (ref 35–47)
HGB BLD-MCNC: 9.8 G/DL (ref 11.5–16)
HGB UR QL STRIP: ABNORMAL
HYALINE CASTS URNS QL MICRO: ABNORMAL /LPF (ref 0–5)
IMM GRANULOCYTES # BLD AUTO: 0.05 K/UL (ref 0–0.04)
IMM GRANULOCYTES NFR BLD AUTO: 1.2 % (ref 0–0.5)
KETONES UR QL STRIP.AUTO: NEGATIVE MG/DL
LACTATE BLD-SCNC: 2.42 MMOL/L (ref 0.4–2)
LACTATE SERPL-SCNC: 2 MMOL/L (ref 0.4–2)
LEUKOCYTE ESTERASE UR QL STRIP.AUTO: ABNORMAL
LIPASE SERPL-CCNC: 29 U/L (ref 13–75)
LYMPHOCYTES # BLD: 0.81 K/UL (ref 0.8–3.5)
LYMPHOCYTES NFR BLD: 18.7 % (ref 12–49)
MAGNESIUM SERPL-MCNC: 1.2 MG/DL (ref 1.6–2.4)
MCH RBC QN AUTO: 22.5 PG (ref 26–34)
MCHC RBC AUTO-ENTMCNC: 29.8 G/DL (ref 30–36.5)
MCV RBC AUTO: 75.6 FL (ref 80–99)
MONOCYTES # BLD: 0.74 K/UL (ref 0–1)
MONOCYTES NFR BLD: 17.1 % (ref 5–13)
NEUTS SEG # BLD: 2.69 K/UL (ref 1.8–8)
NEUTS SEG NFR BLD: 62 % (ref 32–75)
NITRITE UR QL STRIP.AUTO: NEGATIVE
NRBC # BLD: 0 K/UL (ref 0–0.01)
NRBC BLD-RTO: 0 PER 100 WBC
PH UR STRIP: 5.5 (ref 5–8)
PLATELET # BLD AUTO: 263 K/UL (ref 150–400)
PMV BLD AUTO: 10.1 FL (ref 8.9–12.9)
POTASSIUM SERPL-SCNC: 3.1 MMOL/L (ref 3.5–5.1)
PROT SERPL-MCNC: 6.5 G/DL (ref 6.4–8.2)
PROT UR STRIP-MCNC: 100 MG/DL
RBC # BLD AUTO: 4.35 M/UL (ref 3.8–5.2)
RBC #/AREA URNS HPF: ABNORMAL /HPF (ref 0–5)
SERVICE CMNT-IMP: ABNORMAL
SODIUM SERPL-SCNC: 136 MMOL/L (ref 136–145)
SP GR UR REFRACTOMETRY: 1.01 (ref 1–1.03)
SPECIMEN HOLD: NORMAL
TROPONIN I SERPL HS-MCNC: 19 NG/L (ref 0–51)
TSH SERPL DL<=0.05 MIU/L-ACNC: 0.29 UIU/ML (ref 0.36–3.74)
UROBILINOGEN UR QL STRIP.AUTO: 1 EU/DL (ref 0.2–1)
WBC # BLD AUTO: 4.3 K/UL (ref 3.6–11)
WBC URNS QL MICRO: ABNORMAL /HPF (ref 0–4)

## 2025-03-15 PROCEDURE — 83690 ASSAY OF LIPASE: CPT

## 2025-03-15 PROCEDURE — 36415 COLL VENOUS BLD VENIPUNCTURE: CPT

## 2025-03-15 PROCEDURE — 0202U NFCT DS 22 TRGT SARS-COV-2: CPT

## 2025-03-15 PROCEDURE — 2580000003 HC RX 258: Performed by: EMERGENCY MEDICINE

## 2025-03-15 PROCEDURE — 84484 ASSAY OF TROPONIN QUANT: CPT

## 2025-03-15 PROCEDURE — 85025 COMPLETE CBC W/AUTO DIFF WBC: CPT

## 2025-03-15 PROCEDURE — 83735 ASSAY OF MAGNESIUM: CPT

## 2025-03-15 PROCEDURE — 83036 HEMOGLOBIN GLYCOSYLATED A1C: CPT

## 2025-03-15 PROCEDURE — 93005 ELECTROCARDIOGRAM TRACING: CPT | Performed by: EMERGENCY MEDICINE

## 2025-03-15 PROCEDURE — 80053 COMPREHEN METABOLIC PANEL: CPT

## 2025-03-15 PROCEDURE — 6360000002 HC RX W HCPCS: Performed by: INTERNAL MEDICINE

## 2025-03-15 PROCEDURE — 81001 URINALYSIS AUTO W/SCOPE: CPT

## 2025-03-15 PROCEDURE — 82962 GLUCOSE BLOOD TEST: CPT

## 2025-03-15 PROCEDURE — 83605 ASSAY OF LACTIC ACID: CPT

## 2025-03-15 PROCEDURE — 1100000000 HC RM PRIVATE

## 2025-03-15 PROCEDURE — 84443 ASSAY THYROID STIM HORMONE: CPT

## 2025-03-15 PROCEDURE — 99285 EMERGENCY DEPT VISIT HI MDM: CPT

## 2025-03-15 PROCEDURE — 71045 X-RAY EXAM CHEST 1 VIEW: CPT

## 2025-03-15 RX ORDER — ACETAMINOPHEN 325 MG/1
650 TABLET ORAL EVERY 6 HOURS PRN
Status: DISCONTINUED | OUTPATIENT
Start: 2025-03-15 | End: 2025-03-17 | Stop reason: HOSPADM

## 2025-03-15 RX ORDER — SODIUM CHLORIDE 0.9 % (FLUSH) 0.9 %
5-40 SYRINGE (ML) INJECTION EVERY 12 HOURS SCHEDULED
Status: DISCONTINUED | OUTPATIENT
Start: 2025-03-15 | End: 2025-03-17 | Stop reason: HOSPADM

## 2025-03-15 RX ORDER — ONDANSETRON 2 MG/ML
4 INJECTION INTRAMUSCULAR; INTRAVENOUS EVERY 6 HOURS PRN
Status: DISCONTINUED | OUTPATIENT
Start: 2025-03-15 | End: 2025-03-17 | Stop reason: HOSPADM

## 2025-03-15 RX ORDER — POTASSIUM CHLORIDE 750 MG/1
40 TABLET, EXTENDED RELEASE ORAL PRN
Status: DISCONTINUED | OUTPATIENT
Start: 2025-03-15 | End: 2025-03-17 | Stop reason: HOSPADM

## 2025-03-15 RX ORDER — CELECOXIB 100 MG/1
100 CAPSULE ORAL DAILY
COMMUNITY

## 2025-03-15 RX ORDER — POTASSIUM CHLORIDE 7.45 MG/ML
10 INJECTION INTRAVENOUS PRN
Status: DISCONTINUED | OUTPATIENT
Start: 2025-03-15 | End: 2025-03-17 | Stop reason: HOSPADM

## 2025-03-15 RX ORDER — INSULIN LISPRO 100 [IU]/ML
0-8 INJECTION, SOLUTION INTRAVENOUS; SUBCUTANEOUS
Status: DISCONTINUED | OUTPATIENT
Start: 2025-03-15 | End: 2025-03-17 | Stop reason: HOSPADM

## 2025-03-15 RX ORDER — SODIUM CHLORIDE 0.9 % (FLUSH) 0.9 %
5-40 SYRINGE (ML) INJECTION PRN
Status: DISCONTINUED | OUTPATIENT
Start: 2025-03-15 | End: 2025-03-17 | Stop reason: HOSPADM

## 2025-03-15 RX ORDER — SODIUM CHLORIDE 9 MG/ML
INJECTION, SOLUTION INTRAVENOUS PRN
Status: DISCONTINUED | OUTPATIENT
Start: 2025-03-15 | End: 2025-03-17 | Stop reason: HOSPADM

## 2025-03-15 RX ORDER — MAGNESIUM SULFATE HEPTAHYDRATE 40 MG/ML
2000 INJECTION, SOLUTION INTRAVENOUS ONCE
Status: COMPLETED | OUTPATIENT
Start: 2025-03-15 | End: 2025-03-16

## 2025-03-15 RX ORDER — DEXTROSE MONOHYDRATE 100 MG/ML
INJECTION, SOLUTION INTRAVENOUS CONTINUOUS PRN
Status: DISCONTINUED | OUTPATIENT
Start: 2025-03-15 | End: 2025-03-17 | Stop reason: HOSPADM

## 2025-03-15 RX ORDER — ACETAMINOPHEN 650 MG/1
650 SUPPOSITORY RECTAL EVERY 6 HOURS PRN
Status: DISCONTINUED | OUTPATIENT
Start: 2025-03-15 | End: 2025-03-17 | Stop reason: HOSPADM

## 2025-03-15 RX ORDER — ROSUVASTATIN CALCIUM 40 MG/1
40 TABLET, COATED ORAL EVERY EVENING
COMMUNITY

## 2025-03-15 RX ORDER — ONDANSETRON 4 MG/1
4 TABLET, ORALLY DISINTEGRATING ORAL EVERY 8 HOURS PRN
Status: DISCONTINUED | OUTPATIENT
Start: 2025-03-15 | End: 2025-03-17 | Stop reason: HOSPADM

## 2025-03-15 RX ORDER — POLYETHYLENE GLYCOL 3350 17 G/17G
17 POWDER, FOR SOLUTION ORAL DAILY PRN
Status: DISCONTINUED | OUTPATIENT
Start: 2025-03-15 | End: 2025-03-17 | Stop reason: HOSPADM

## 2025-03-15 RX ORDER — MAGNESIUM SULFATE IN WATER 40 MG/ML
2000 INJECTION, SOLUTION INTRAVENOUS PRN
Status: DISCONTINUED | OUTPATIENT
Start: 2025-03-15 | End: 2025-03-17 | Stop reason: HOSPADM

## 2025-03-15 RX ORDER — SODIUM CHLORIDE AND POTASSIUM CHLORIDE 300; 900 MG/100ML; MG/100ML
INJECTION, SOLUTION INTRAVENOUS CONTINUOUS
Status: DISCONTINUED | OUTPATIENT
Start: 2025-03-15 | End: 2025-03-16

## 2025-03-15 RX ORDER — 0.9 % SODIUM CHLORIDE 0.9 %
1000 INTRAVENOUS SOLUTION INTRAVENOUS ONCE
Status: COMPLETED | OUTPATIENT
Start: 2025-03-15 | End: 2025-03-15

## 2025-03-15 RX ADMIN — SODIUM CHLORIDE 1000 ML: 0.9 INJECTION, SOLUTION INTRAVENOUS at 20:19

## 2025-03-15 RX ADMIN — POTASSIUM CHLORIDE AND SODIUM CHLORIDE: 900; 300 INJECTION, SOLUTION INTRAVENOUS at 21:31

## 2025-03-15 ASSESSMENT — PAIN - FUNCTIONAL ASSESSMENT: PAIN_FUNCTIONAL_ASSESSMENT: NONE - DENIES PAIN

## 2025-03-15 NOTE — ED TRIAGE NOTES
[facility]/o diarrhea x3-4 days with worsening weakness. Per EMS pt lives at home alone and her daughter is her caretaker. Pt is aaox4, gcs 15, speaking in full clear sentences. NAD noted.

## 2025-03-16 ENCOUNTER — APPOINTMENT (OUTPATIENT)
Facility: HOSPITAL | Age: 89
DRG: 375 | End: 2025-03-16
Payer: MEDICARE

## 2025-03-16 LAB
ANION GAP SERPL CALC-SCNC: 8 MMOL/L (ref 2–12)
B PERT DNA SPEC QL NAA+PROBE: NOT DETECTED
BASOPHILS # BLD: 0.01 K/UL (ref 0–0.1)
BASOPHILS NFR BLD: 0.3 % (ref 0–1)
BORDETELLA PARAPERTUSSIS BY PCR: NOT DETECTED
BUN SERPL-MCNC: 18 MG/DL (ref 6–20)
BUN/CREAT SERPL: 29 (ref 12–20)
C PNEUM DNA SPEC QL NAA+PROBE: NOT DETECTED
CALCIUM SERPL-MCNC: 8.5 MG/DL (ref 8.5–10.1)
CHLORIDE SERPL-SCNC: 108 MMOL/L (ref 97–108)
CO2 SERPL-SCNC: 24 MMOL/L (ref 21–32)
CREAT SERPL-MCNC: 0.63 MG/DL (ref 0.55–1.02)
DIFFERENTIAL METHOD BLD: ABNORMAL
EKG DIAGNOSIS: NORMAL
EKG Q-T INTERVAL: 370 MS
EKG QRS DURATION: 76 MS
EKG QTC CALCULATION (BAZETT): 505 MS
EKG R AXIS: 76 DEGREES
EKG T AXIS: -10 DEGREES
EKG VENTRICULAR RATE: 112 BPM
EOSINOPHIL # BLD: 0.01 K/UL (ref 0–0.4)
EOSINOPHIL NFR BLD: 0.3 % (ref 0–7)
ERYTHROCYTE [DISTWIDTH] IN BLOOD BY AUTOMATED COUNT: 17.3 % (ref 11.5–14.5)
EST. AVERAGE GLUCOSE BLD GHB EST-MCNC: 169 MG/DL
FERRITIN SERPL-MCNC: 118 NG/ML (ref 8–252)
FLUAV H1 2009 PAND RNA SPEC QL NAA+PROBE: DETECTED
FLUBV RNA SPEC QL NAA+PROBE: NOT DETECTED
FOLATE SERPL-MCNC: 19 NG/ML (ref 5–21)
GLUCOSE BLD STRIP.AUTO-MCNC: 107 MG/DL (ref 65–117)
GLUCOSE BLD STRIP.AUTO-MCNC: 142 MG/DL (ref 65–117)
GLUCOSE BLD STRIP.AUTO-MCNC: 145 MG/DL (ref 65–117)
GLUCOSE BLD STRIP.AUTO-MCNC: 147 MG/DL (ref 65–117)
GLUCOSE BLD STRIP.AUTO-MCNC: 148 MG/DL (ref 65–117)
GLUCOSE SERPL-MCNC: 126 MG/DL (ref 65–100)
HADV DNA SPEC QL NAA+PROBE: NOT DETECTED
HBA1C MFR BLD: 7.5 % (ref 4–5.6)
HCOV 229E RNA SPEC QL NAA+PROBE: NOT DETECTED
HCOV HKU1 RNA SPEC QL NAA+PROBE: NOT DETECTED
HCOV NL63 RNA SPEC QL NAA+PROBE: NOT DETECTED
HCOV OC43 RNA SPEC QL NAA+PROBE: NOT DETECTED
HCT VFR BLD AUTO: 31.5 % (ref 35–47)
HGB BLD-MCNC: 9.3 G/DL (ref 11.5–16)
HMPV RNA SPEC QL NAA+PROBE: NOT DETECTED
HPIV1 RNA SPEC QL NAA+PROBE: NOT DETECTED
HPIV2 RNA SPEC QL NAA+PROBE: NOT DETECTED
HPIV3 RNA SPEC QL NAA+PROBE: NOT DETECTED
HPIV4 RNA SPEC QL NAA+PROBE: NOT DETECTED
IMM GRANULOCYTES # BLD AUTO: 0.04 K/UL (ref 0–0.04)
IMM GRANULOCYTES NFR BLD AUTO: 1.2 % (ref 0–0.5)
IRON SATN MFR SERPL: 10 % (ref 20–50)
IRON SERPL-MCNC: 29 UG/DL (ref 35–150)
LYMPHOCYTES # BLD: 0.67 K/UL (ref 0.8–3.5)
LYMPHOCYTES NFR BLD: 19.6 % (ref 12–49)
M PNEUMO DNA SPEC QL NAA+PROBE: NOT DETECTED
MCH RBC QN AUTO: 22.5 PG (ref 26–34)
MCHC RBC AUTO-ENTMCNC: 29.5 G/DL (ref 30–36.5)
MCV RBC AUTO: 76.3 FL (ref 80–99)
MONOCYTES # BLD: 0.54 K/UL (ref 0–1)
MONOCYTES NFR BLD: 16 % (ref 5–13)
NEUTS SEG # BLD: 2.13 K/UL (ref 1.8–8)
NEUTS SEG NFR BLD: 62.6 % (ref 32–75)
NRBC # BLD: 0 K/UL (ref 0–0.01)
NRBC BLD-RTO: 0 PER 100 WBC
PHOSPHATE SERPL-MCNC: 2 MG/DL (ref 2.6–4.7)
PLATELET # BLD AUTO: 239 K/UL (ref 150–400)
PMV BLD AUTO: 9.9 FL (ref 8.9–12.9)
POTASSIUM SERPL-SCNC: 3.2 MMOL/L (ref 3.5–5.1)
RBC # BLD AUTO: 4.13 M/UL (ref 3.8–5.2)
RBC MORPH BLD: ABNORMAL
RSV RNA SPEC QL NAA+PROBE: NOT DETECTED
RV+EV RNA SPEC QL NAA+PROBE: NOT DETECTED
SARS-COV-2 RNA RESP QL NAA+PROBE: NOT DETECTED
SERVICE CMNT-IMP: ABNORMAL
SERVICE CMNT-IMP: NORMAL
SODIUM SERPL-SCNC: 140 MMOL/L (ref 136–145)
TIBC SERPL-MCNC: 277 UG/DL (ref 250–450)
VIT B12 SERPL-MCNC: 958 PG/ML (ref 193–986)
WBC # BLD AUTO: 3.4 K/UL (ref 3.6–11)

## 2025-03-16 PROCEDURE — 2500000003 HC RX 250 WO HCPCS: Performed by: INTERNAL MEDICINE

## 2025-03-16 PROCEDURE — 82746 ASSAY OF FOLIC ACID SERUM: CPT

## 2025-03-16 PROCEDURE — 83540 ASSAY OF IRON: CPT

## 2025-03-16 PROCEDURE — 1100000000 HC RM PRIVATE

## 2025-03-16 PROCEDURE — 97535 SELF CARE MNGMENT TRAINING: CPT

## 2025-03-16 PROCEDURE — 84100 ASSAY OF PHOSPHORUS: CPT

## 2025-03-16 PROCEDURE — 82728 ASSAY OF FERRITIN: CPT

## 2025-03-16 PROCEDURE — 85025 COMPLETE CBC W/AUTO DIFF WBC: CPT

## 2025-03-16 PROCEDURE — 6370000000 HC RX 637 (ALT 250 FOR IP): Performed by: STUDENT IN AN ORGANIZED HEALTH CARE EDUCATION/TRAINING PROGRAM

## 2025-03-16 PROCEDURE — 97161 PT EVAL LOW COMPLEX 20 MIN: CPT

## 2025-03-16 PROCEDURE — 6360000002 HC RX W HCPCS: Performed by: STUDENT IN AN ORGANIZED HEALTH CARE EDUCATION/TRAINING PROGRAM

## 2025-03-16 PROCEDURE — 6360000004 HC RX CONTRAST MEDICATION: Performed by: RADIOLOGY

## 2025-03-16 PROCEDURE — 71260 CT THORAX DX C+: CPT

## 2025-03-16 PROCEDURE — 99223 1ST HOSP IP/OBS HIGH 75: CPT | Performed by: INTERNAL MEDICINE

## 2025-03-16 PROCEDURE — 6370000000 HC RX 637 (ALT 250 FOR IP): Performed by: INTERNAL MEDICINE

## 2025-03-16 PROCEDURE — 2500000003 HC RX 250 WO HCPCS: Performed by: NURSE PRACTITIONER

## 2025-03-16 PROCEDURE — 94761 N-INVAS EAR/PLS OXIMETRY MLT: CPT

## 2025-03-16 PROCEDURE — 97530 THERAPEUTIC ACTIVITIES: CPT

## 2025-03-16 PROCEDURE — 97165 OT EVAL LOW COMPLEX 30 MIN: CPT

## 2025-03-16 PROCEDURE — 80048 BASIC METABOLIC PNL TOTAL CA: CPT

## 2025-03-16 PROCEDURE — 82607 VITAMIN B-12: CPT

## 2025-03-16 PROCEDURE — 83550 IRON BINDING TEST: CPT

## 2025-03-16 PROCEDURE — 82962 GLUCOSE BLOOD TEST: CPT

## 2025-03-16 PROCEDURE — 93010 ELECTROCARDIOGRAM REPORT: CPT | Performed by: INTERNAL MEDICINE

## 2025-03-16 PROCEDURE — 2580000003 HC RX 258: Performed by: INTERNAL MEDICINE

## 2025-03-16 PROCEDURE — 97116 GAIT TRAINING THERAPY: CPT

## 2025-03-16 RX ORDER — POTASSIUM CHLORIDE 750 MG/1
40 TABLET, EXTENDED RELEASE ORAL ONCE
Status: DISCONTINUED | OUTPATIENT
Start: 2025-03-16 | End: 2025-03-17 | Stop reason: HOSPADM

## 2025-03-16 RX ORDER — LORAZEPAM 0.5 MG/1
0.5 TABLET ORAL ONCE
Status: DISCONTINUED | OUTPATIENT
Start: 2025-03-16 | End: 2025-03-17 | Stop reason: HOSPADM

## 2025-03-16 RX ORDER — IOPAMIDOL 755 MG/ML
100 INJECTION, SOLUTION INTRAVASCULAR
Status: COMPLETED | OUTPATIENT
Start: 2025-03-16 | End: 2025-03-16

## 2025-03-16 RX ORDER — NITROFURANTOIN 25; 75 MG/1; MG/1
100 CAPSULE ORAL EVERY 12 HOURS SCHEDULED
Status: DISCONTINUED | OUTPATIENT
Start: 2025-03-16 | End: 2025-03-17 | Stop reason: HOSPADM

## 2025-03-16 RX ORDER — GUAIFENESIN 200 MG/10ML
400 LIQUID ORAL EVERY 4 HOURS PRN
Status: DISCONTINUED | OUTPATIENT
Start: 2025-03-16 | End: 2025-03-17 | Stop reason: HOSPADM

## 2025-03-16 RX ORDER — SODIUM CHLORIDE AND POTASSIUM CHLORIDE 300; 900 MG/100ML; MG/100ML
INJECTION, SOLUTION INTRAVENOUS CONTINUOUS
Status: DISPENSED | OUTPATIENT
Start: 2025-03-16 | End: 2025-03-17

## 2025-03-16 RX ORDER — OSELTAMIVIR PHOSPHATE 6 MG/ML
75 FOR SUSPENSION ORAL 2 TIMES DAILY
Status: DISCONTINUED | OUTPATIENT
Start: 2025-03-16 | End: 2025-03-17 | Stop reason: HOSPADM

## 2025-03-16 RX ADMIN — NITROFURANTOIN MONOHYDRATE/MACROCRYSTALS 100 MG: 75; 25 CAPSULE ORAL at 20:44

## 2025-03-16 RX ADMIN — GUAIFENESIN 400 MG: 200 SOLUTION ORAL at 06:31

## 2025-03-16 RX ADMIN — GUAIFENESIN 400 MG: 200 SOLUTION ORAL at 01:10

## 2025-03-16 RX ADMIN — OSELTAMIVIR PHOSPHATE 75 MG: 6 POWDER, FOR SUSPENSION ORAL at 14:53

## 2025-03-16 RX ADMIN — FAMOTIDINE 20 MG: 10 INJECTION, SOLUTION INTRAVENOUS at 09:39

## 2025-03-16 RX ADMIN — GUAIFENESIN 400 MG: 200 SOLUTION ORAL at 11:09

## 2025-03-16 RX ADMIN — POTASSIUM CHLORIDE 40 MEQ: 750 TABLET, FILM COATED, EXTENDED RELEASE ORAL at 09:39

## 2025-03-16 RX ADMIN — NITROFURANTOIN MONOHYDRATE/MACROCRYSTALS 100 MG: 75; 25 CAPSULE ORAL at 11:03

## 2025-03-16 RX ADMIN — OSELTAMIVIR PHOSPHATE 75 MG: 6 POWDER, FOR SUSPENSION ORAL at 20:44

## 2025-03-16 RX ADMIN — SODIUM CHLORIDE, PRESERVATIVE FREE 10 ML: 5 INJECTION INTRAVENOUS at 20:44

## 2025-03-16 RX ADMIN — ACETAMINOPHEN 650 MG: 325 TABLET ORAL at 21:52

## 2025-03-16 RX ADMIN — IOPAMIDOL 100 ML: 755 INJECTION, SOLUTION INTRAVENOUS at 10:39

## 2025-03-16 RX ADMIN — POTASSIUM CHLORIDE AND SODIUM CHLORIDE: 900; 300 INJECTION, SOLUTION INTRAVENOUS at 14:57

## 2025-03-16 RX ADMIN — ALUMINUM HYDROXIDE, MAGNESIUM HYDROXIDE, AND SIMETHICONE 40 ML: 1200; 120; 1200 SUSPENSION ORAL at 16:59

## 2025-03-16 RX ADMIN — MAGNESIUM SULFATE HEPTAHYDRATE 2000 MG: 40 INJECTION, SOLUTION INTRAVENOUS at 00:36

## 2025-03-16 RX ADMIN — SODIUM CHLORIDE, PRESERVATIVE FREE 10 ML: 5 INJECTION INTRAVENOUS at 09:39

## 2025-03-16 ASSESSMENT — PAIN DESCRIPTION - DESCRIPTORS: DESCRIPTORS: ACHING

## 2025-03-16 ASSESSMENT — PAIN SCALES - GENERAL: PAINLEVEL_OUTOF10: 7

## 2025-03-16 ASSESSMENT — PAIN DESCRIPTION - ORIENTATION: ORIENTATION: LOWER

## 2025-03-16 ASSESSMENT — PAIN DESCRIPTION - LOCATION: LOCATION: BACK

## 2025-03-16 NOTE — PLAN OF CARE
Problem: Chronic Conditions and Co-morbidities  Goal: Patient's chronic conditions and co-morbidity symptoms are monitored and maintained or improved  Outcome: Progressing  Flowsheets (Taken 3/15/2025 2240)  Care Plan - Patient's Chronic Conditions and Co-Morbidity Symptoms are Monitored and Maintained or Improved: Monitor and assess patient's chronic conditions and comorbid symptoms for stability, deterioration, or improvement     Problem: Discharge Planning  Goal: Discharge to home or other facility with appropriate resources  Outcome: Progressing  Flowsheets (Taken 3/15/2025 2240)  Discharge to home or other facility with appropriate resources: Identify barriers to discharge with patient and caregiver     Problem: Safety - Adult  Goal: Free from fall injury  Outcome: Progressing

## 2025-03-16 NOTE — ED PROVIDER NOTES
[facility] EMERGENCY DEPARTMENT  EMERGENCY DEPARTMENT ENCOUNTER      Pt Name: Rhea Bennett  MRN: [id number]  Birthdate 1935  Date of evaluation: 3/15/2025  Provider: Bijan Young MD    CHIEF COMPLAINT       Chief Complaint   Patient presents with    Diarrhea         HISTORY OF PRESENT ILLNESS   (Location/Symptom, Timing/Onset, Context/Setting, Quality, Duration, Modifying Factors, Severity)  Note limiting factors.   89-year-old with a history of hypertension, diabetes, hyperlipidemia, A-fib.  She presents via EMS accompanied by her daughter (who provides most of the history) from an independent living facility with complaints of a 3-day history of diarrhea.  The patient is unable to provide any meaningful history.  Her daughter reports that the patient has had multiple episodes of diarrhea over the past 3 days.  She has not been eating much of anything.  She has been able to tolerate some Pedialyte and Gatorade.  Her daughter states that she has been \"lethargic.\"  They returned from a cruise 1 week ago.  No recent antibiotics.  Her daughter states that she was too weak to walk prior to arrival.  No known fever.          Review of External Medical Records:     Nursing Notes were reviewed.    REVIEW OF SYSTEMS    (2-9 systems for level 4, 10 or more for level 5)     Review of Systems    Except as noted above the remainder of the review of systems was reviewed and negative.       PAST MEDICAL HISTORY     Past Medical History:   Diagnosis Date    Diabetes mellitus type 1 (HCC)     Hypercholesteremia     Hypertension     Stroke (HCC) 3/6/2011         SURGICAL HISTORY       Past Surgical History:   Procedure Laterality Date    CHOLECYSTECTOMY           CURRENT MEDICATIONS       Previous Medications    ASPIRIN 81 MG EC TABLET    Take by mouth daily    CARVEDILOL (COREG) 6.25 MG TABLET    Take by mouth every 12 hours    DILTIAZEM (TIAZAC) 120 MG EXTENDED RELEASE CAPSULE    Take by mouth daily     2.3 (*)     Globulin 4.2 (*)     Albumin/Globulin Ratio 0.5 (*)     All other components within normal limits   POC LACTIC ACID - Abnormal; Notable for the following components:    POC Lactic Acid 2.42 (*)     All other components within normal limits   LIPASE   TROPONIN   MAGNESIUM   POCT LACTIC ACID       All other labs were within normal range or not returned as of this dictation.    EMERGENCY DEPARTMENT COURSE and DIFFERENTIAL DIAGNOSIS/MDM:   Vitals:    Vitals:    03/15/25 1944 03/15/25 2030   BP: (!) 107/59 108/84   Pulse: (!) 107 (!) 104   Resp: 20    Temp: 98.1 °F (36.7 °C)    TempSrc: Oral    SpO2: 94% 92%   Weight: 111.4 kg (245 lb 9.5 oz)    Height: 1.6 m (5' 3\")            Medical Decision Making  Amount and/or Complexity of Data Reviewed  Labs: ordered.  ECG/medicine tests: ordered.    Risk  Prescription drug management.            REASSESSMENT            CONSULTS:    Consult note: I reached out to Dr. Benitez (hospitalist) via [employer] for admission.  Bijan Young MD  8:57 PM      PROCEDURES:  Unless otherwise noted below, none     Procedures      FINAL IMPRESSION      Perfect Serve Consult for Admission  8:53 PM    ED Room Number: ER16/16  Patient Name and age:  Rhea Bennett 89 y.o.  female  Working Diagnosis: Diarrhea/weakness    COVID-19 Suspicion: No  Sepsis present:  No  Reassessment needed: Yes  Code Status:  Full Code  Readmission: No  Isolation Requirements: no  Recommended Level of Care: telemetry  Department: [place] [facility] - [phone]  89-year-old with a history of hypertension, diabetes, hyperlipidemia, A-fib.  She presents with a 3-day history of diarrhea.  She has become lethargic per daughter with poor p.o. intake.  She has become too weak to walk.  Workup remarkable for an initial lactate of 2.4.  BUN and creatinine are 27 and 1.06.  Hemoglobin 9.8.  IV fluids.    DISPOSITION/PLAN   DISPOSITION        PATIENT REFERRED TO:  No follow-up provider specified.    DISCHARGE

## 2025-03-16 NOTE — CONSULTS
discuss management options. The son was more agreeable and asked to speak with me in the hallway where we discussed options further.    We discussed that further workup would require colonoscopy as well as a CT guided lung biopsy. Treatment likely would involve surgery to address impending bowel obstruction followed by systemic therapy such as chemotherapy or immunotherapy. I am doubtful that she would tolerate these treatments. However, if her current poor performance status is due to influenza, it is possible that she may improve enough to consider cancer treatment. Alternatively, it would be reasonable to shift our focus towards support care and symptom management, including hospice care.    He would like to consider these options and discuss with the patient and family. We will regroup tomorrow to discuss further. For now, continue symptom management and supportive care. I discussed with the hospitalist, Dr. Horta, and he has placed a consult for palliative medicine to assist tomorrow.      Abnormal imaging of endometrium  Thickening on CT. Hold on workup for now while we await decision on goals of care.      Anemia, microcytic  Possibly some iron deficiency with iron saturation of 10%, though ferritin normal at 118. Likely some occult blood loss related to her colon cancer. Monitor for now while we await goals of care decision.      Influenza  Tested positive for Flu A and started on Tamiflu by hospitalist.      Diarrhea  Possibly related to her colon cancer vs influenza. With recent cruise, stool studies were also ordered and are pending.  --Continue management per hospitalist      Elevated TSH  Check Free T4      I will follow along.    Signed By: Arias Chairez MD

## 2025-03-16 NOTE — PROGRESS NOTES
Hospitalist Progress Note      NAME:  Rhea Bennett   :  1935  MRM:  [id number]    Date/Time: 3/16/2025  11:28 AM           Assessment / Plan:     Ms. Bennett is a 89 y.o. female who is being admitted for Acute diarrhea. Ms. Bennett presented to our Emergency Department today complaining of  a now persistent diarrhea for the past 3-4 days. Her daughter mentioned they went for a cruise to the [facility] and returned here a week ago. Her symptoms started about three days later.  She is admitted for further evaluation and management.      Acute diarrhea POA: unclear etiology but associated with volume depletion. Given her recent cruise, an infectious etiology cannot be excluded.  Get an enteric panel, stool for ova and parasites and rotavirus. Abdominal exam is benign.  Continue with IV fluids + potassium repletion, ADAT and follow clinical progress    Abnormal CXR: Noted bilateral pulmonary masses on the CXR with suspected metastatic disease.  She does report chronic cough. Daughter report prior hx of cancer when she was young but unable to recall much. No recent evidence of any malignancy or work up. Explained that CXR highly suspicious for metastatic process with unknown primary location and we need further imaging to start the work up but overall prognosis is guarded and she is not a good candidate for treatments given her age and poor functional status.  She is okay getting CT chest/abdomen/pelvis with contrast. She did mention patient wants to be DNR. Consulted palliative care.  Continue supportive care.      UTI: She is allergic to penicillin.  Start Macrobid.  Follow urine cultures.    Hypokalemia: Continue with daily potassium supplementation.  Monitor electrolytes closely.     Microcytic anemia POA: there is a family Hx of Thalassemia. She has never been tested. Check iron serologies for now. Resume Iron when tolerating oral intake      PAF (paroxysmal atrial fibrillation) POA: rate controlled. Not chronically  mEq infusion   IntraVENous Continuous    insulin lispro (HUMALOG,ADMELOG) injection vial 0-8 Units  0-8 Units SubCUTAneous 4x Daily AC & HS    glucose chewable tablet 16 g  4 tablet Oral PRN    dextrose bolus 10% 125 mL  125 mL IntraVENous PRN    Or    dextrose bolus 10% 250 mL  250 mL IntraVENous PRN    glucagon injection 1 mg  1 mg SubCUTAneous PRN    dextrose 10 % infusion   IntraVENous Continuous PRN    famotidine (PEPCID) 20 MG/2ML 20 mg in sodium chloride (PF) 0.9 % 10 mL injection  20 mg IntraVENous Daily            Lab Review:     Recent Labs     03/15/25  1952 03/16/25  0559   WBC 4.3 3.4*   HGB 9.8* 9.3*   HCT 32.9* 31.5*    239     Recent Labs     03/15/25  1952 03/16/25  0559    140   K 3.1* 3.2*    108   CO2 24 24   BUN 27* 18   MG 1.2*  --    PHOS  --  2.0*   ALT 13  --      No components found for: \"GLPOC\"

## 2025-03-16 NOTE — H&P
[facility]  [address] [place]  [address] [phone]    [facility] Medicine History and Physical      NAME:       Rhea Bennett   :       1935   MRN:      [id number]     Date of service:   3/15/2025     Chief  Complaint:  Acute diarrhea     History Of Presenting Illness:       Ms. Bennett is a 89 y.o. female who is being admitted for Acute diarrhea. Ms. Bennett presented to our Emergency Department today complaining of  a now persistent diarrhea for the past 3-4 days. Her daughter tells me they went for a cruise to the [facility] and returned here a week ago. Her symptoms started about three days later. Symptoms did not improve with oral hydration and were associated with generalized weakness and a decreased appetite. Unclear if her stools had any blood in them. In the ED, she was found to be hypokalemia and dehydrated. She will be admitted for further management. She is not a good historian and as such, I have discussed with her daughter for collaborative hx.     Allergies   Allergen Reactions    Penicillins Hives       Prior to Admission medications    Medication Sig Start Date End Date Taking? Authorizing Provider   aspirin 81 MG EC tablet Take by mouth daily    Automatic Reconciliation, Ar   carvedilol (COREG) 6.25 MG tablet Take by mouth every 12 hours 13   Automatic Reconciliation, Ar   dilTIAZem (TIAZAC) 120 MG extended release capsule Take by mouth daily 13   Automatic Reconciliation, Ar   ferrous sulfate (IRON 325) 325 (65 Fe) MG tablet Take by mouth every morning (before breakfast) 13   Automatic Reconciliation, Ar   glipiZIDE (GLUCOTROL) 5 MG tablet TAKE ONE TABLET BY MOUTH TWICE DAILY 12   Automatic Reconciliation, Ar   guaiFENesin (ROBITUSSIN) 100 MG/5ML liquid Take by mouth every 4 hours as needed 13   Automatic  Reconciliation, Ar   ibuprofen (ADVIL;MOTRIN) 600 MG tablet Take by mouth every 6 hours as needed 8/23/18   Automatic Reconciliation, Ar   lisinopril (PRINIVIL;ZESTRIL) 10 MG tablet Take by mouth daily    Automatic Reconciliation, Ar   metFORMIN (GLUCOPHAGE) 1000 MG tablet TAKE ONE TABLET BY MOUTH TWICE DAILY - DUE FOR FASTING OFFICE VISIT 4/9/12   Automatic Reconciliation, Ar   methylPREDNISolone (MEDROL DOSEPACK) 4 MG tablet Take per package insert 8/23/18   Automatic Reconciliation, Ar   ondansetron (ZOFRAN) 4 MG tablet Take by mouth every 8 hours as needed 7/29/19   Automatic Reconciliation, Ar   oxyCODONE-acetaminophen (PERCOCET) 5-325 MG per tablet Take 1 tablet by mouth every 6 hours as needed. 9/20/13   Automatic Reconciliation, Ar   pantoprazole (PROTONIX) 40 MG tablet TAKE ONE TABLET BY MOUTH EVERY DAY 4/16/14   Automatic Reconciliation, Ar   polyethylene glycol (GLYCOLAX) 17 GM/SCOOP powder Take by mouth as needed 9/23/13   Automatic Reconciliation, Ar   simvastatin (ZOCOR) 40 MG tablet Take by mouth 2/25/13   Automatic Reconciliation, Ar       Past Medical History:   Diagnosis Date    Diabetes mellitus type 1 (HCC)     Hypercholesteremia     Hypertension     Stroke (HCC) 3/6/2011        Past Surgical History:   Procedure Laterality Date    CHOLECYSTECTOMY         Social History     Tobacco Use    Smoking status: Never    Smokeless tobacco: Never   Substance Use Topics    Alcohol use: No        Family History   Problem Relation Age of Onset    Osteoarthritis Father      Review of Systems: limited from the patient given her lethargy     Examination:    Constitutional:  /77   Pulse 95   Temp 98.1 °F (36.7 °C) (Oral)   Resp 20   Ht 1.6 m (5' 3\")   Wt 111.4 kg (245 lb 9.5 oz)   SpO2 96%   BMI 43.50 kg/m²       General:  Weak and ill looking patient in no acute distress  Eyes: Pink conjunctivae, PERRLA with no discharge. Normal eye movements  Ear, Nose, Mouth & Throat: No ottorrhea, rhinorrhea,

## 2025-03-16 NOTE — ED NOTES
Health Maintenance Due   Topic Date Due   • Colorectal Cancer Screening-Colonoscopy  07/05/2014   • Hepatitis C Screening  07/05/2015   • Breast Cancer Screening  05/26/2018   • Influenza Vaccine (1) 09/01/2018       Patient is due for the topics as listed above and wishes to proceed with  FOBT SCREENING AND WILL SCHEDULE FASTING LAB WORK AND MAMMOGRAM          TRANSFER - OUT REPORT:    Verbal report given on Rhea Bennett  being transferred to [facility] for routine progression of patient care       Report consisted of patient's Situation, Background, Assessment and   Recommendations(SBAR).     Information from the following report(s) Nurse Handoff Report, Index, ED Encounter Summary, ED SBAR, Adult Overview, MAR, and Recent Results was reviewed with the receiving nurse.    [place] Fall Assessment:    Presents to emergency department  because of falls (Syncope, seizure, or loss of consciousness): No  Age > 70: Yes  Altered Mental Status, Intoxication with alcohol or substance confusion (Disorientation, impaired judgment, poor safety awaremess, or inability to follow instructions): No  Impaired Mobility: Ambulates or transfers with assistive devices or assistance; Unable to ambulate or transer.: No  Nursing Judgement: Yes          Lines:   Peripheral IV 03/15/25 Left Antecubital (Active)   Site Assessment Clean, dry & intact 03/15/25 1948   Line Status Brisk blood return;Specimen collected;Flushed;Normal saline locked 03/15/25 1948   Line Care Connections checked and tightened;Line pulled back 03/15/25 1948   Phlebitis Assessment No symptoms 03/15/25 1948   Infiltration Assessment 0 03/15/25 1948   Dressing Status New dressing applied;Clean, dry & intact 03/15/25 1948   Dressing Type Transparent 03/15/25 1948        Opportunity for questions and clarification was provided.      Patient transported with:  Nurse

## 2025-03-16 NOTE — PLAN OF CARE
Problem: Physical Therapy - Adult  Goal: By Discharge: Performs mobility at highest level of function for planned discharge setting.  See evaluation for individualized goals.  Description: FUNCTIONAL STATUS PRIOR TO ADMISSION: Patient was modified independent using a rolling walker for functional mobility.    HOME SUPPORT PRIOR TO ADMISSION: The patient lived alone in [facility], family to provide assistance.    Physical Therapy Goals  Initiated 3/16/2025  1.  Patient will move from supine to sit and sit to supine, scoot up and down, and roll side to side in bed with independence within 7 day(s).    2.  Patient will perform sit to stand with modified independence within 7 day(s).  3.  Patient will transfer from bed to chair and chair to bed with modified independence using the least restrictive device within 7 day(s).  4.  Patient will ambulate with modified independence for 200 feet with the least restrictive device within 7 day(s).     Outcome: Progressing   PHYSICAL THERAPY EVALUATION    Patient: Rhea Bennett (89 y.o. female)  Date: 3/16/2025  Primary Diagnosis: Acute diarrhea [R19.7]  General weakness [R53.1]  Diarrhea, unspecified type [R19.7]       Precautions:              ASSESSMENT :   DEFICITS/IMPAIRMENTS:   The patient is limited by decreased functional mobility, independence in ADLs, high-level IADLs, strength, body mechanics, activity tolerance, endurance, safety awareness, coordination, balance, vision/visual deficit, posture     Based on the impairments listed above patient presents with generalized weakness. Patient lives alone in [facility]. Patient goes to the dining room to eat using a rolling walker and just got back from a cruise vacation. Patient having diarrhea and feels so weak at home which prompted her to go to the hospital. Communicated with nurse cleared for therapy. Patient supine on bed when received family at bedside agreed

## 2025-03-16 NOTE — PLAN OF CARE
Problem: Occupational Therapy - Adult  Goal: By Discharge: Performs self-care activities at highest level of function for planned discharge setting.  See evaluation for individualized goals.  Description: FUNCTIONAL STATUS PRIOR TO ADMISSION:  Patient reports independence with ADL tasks and uses a RW for mobility.     ,  ,  ,  ,  ,  ,  ,  ,  ,  ,       HOME SUPPORT: Patient lived alone at [facility]..    Occupational Therapy Goals:  Initiated 3/16/2025  1.  Patient will perform lower body dressing with Modified [place] within 7 day(s).  2.  Patient will perform grooming , standing at sink, with Modified [place] within 7 day(s).  3.  Patient will perform toilet transfers with Modified [place]  within 7 day(s).  4.  Patient will perform all aspects of toileting with Modified [place] within 7 day(s).  5.  Patient will participate in upper extremity therapeutic exercise/activities with Modified [place] for 10 minutes within 7 day(s).      Outcome: Progressing   OCCUPATIONAL THERAPY EVALUATION    Patient: Rhea Bennett (89 y.o. female)  Date: 3/16/2025  Primary Diagnosis: Acute diarrhea [R19.7]  General weakness [R53.1]  Diarrhea, unspecified type [R19.7]         Precautions:                    ASSESSMENT :  The patient is limited by decreased functional mobility, independence in ADLs, strength, activity tolerance, safety awareness, balance following admission for acute diarrhea, and generalized weakness.  Patient reports recent cruise and 3 days after return symptoms began.  Patient agreeable to activity but reports decreased vision, glasses not present but able to manage within home.  Patient today requires CGA to min assist for ADL tasks and mobility.  She uses a rolling walker at baseline but requires verbal cues for hand placement, safety with sit to stand/stand to sit, and positioning when ambulating.  She does correct with instruction. She reports daughter can  registered nurse         Thank you for this referral.  Millie Guajardo OTR/L  Minutes: 35    Occupational Therapy Evaluation Charge Determination   History Examination Decision-Making   LOW Complexity : Brief history review  LOW Complexity: 1-3 Performance deficits relating to physical, cognitive, or psychosocial skills that result in activity limitations and/or participation restrictions LOW Complexity: No comorbidities that affect functional and  no verbal  or physical assist needed to complete eval tasks   Based on the above components, the patient evaluation is determined to be of the following complexity level: Low

## 2025-03-16 NOTE — ACP (ADVANCE CARE PLANNING)
[facility] Medicine                                   Advance Care Planning Note    Name: Rhea Bennett  YOB: 1935  MRN: [id number]  Admission Date: 3/15/2025  7:38 PM    Date of discussion: 3/16/2025    Active Diagnoses:      Metastatic cancer  Paroxysmal A-fib  Hypertension  Diabetes  Anemia    These active diagnoses are of sufficient risk that focused discussion on advance care planning is indicated in order to allow the patient to thoughtfully consider personal goals of care, and if situations arise that prevent the ability to personally give input, to ensure appropriate representation of their personal desires for different levels and aggressiveness of care.     Discussion:     Persons present and participating in discussion: Rhea BennettKatelyn MD, POA/Daughter Zita Recio    Topics Discussed:  Patient's medical condition and diagnosis: [ x ] yes [  ] no   Surrogate decision maker: [ x ] yes [  ] no   Patient's current physical function/cognitive function/frailty: [ x ] yes [  ] no   Code Status: [ x ] yes [  ] no   Artificial Nutrition / Dialysis / Non-Invasive Ventilation / Blood Transfusion: [ x ] yes [  ] no  Potential Resources for home (durable medical equipment, home nursing, home O2): [ x ] yes [  ] no    Overview of Discussion:   Discussed Imaging findings that is most likely consistent with metastatic malignancy.  Given her age, poor functional baseline status, and metastatic disease process, overall prognosis is poor.  Daughter mentioned that she wished to be DNR.      Time Spent:     Total time spent face-to-face in education and discussion: 16 minutes.     Katelyn Horta MD  Date of Service:  3/16/2025  9:59 AM

## 2025-03-17 VITALS
BODY MASS INDEX: 43.52 KG/M2 | SYSTOLIC BLOOD PRESSURE: 155 MMHG | OXYGEN SATURATION: 94 % | RESPIRATION RATE: 18 BRPM | DIASTOLIC BLOOD PRESSURE: 92 MMHG | HEART RATE: 124 BPM | HEIGHT: 63 IN | WEIGHT: 245.59 LBS | TEMPERATURE: 97.5 F

## 2025-03-17 LAB
ANION GAP SERPL CALC-SCNC: 7 MMOL/L (ref 2–12)
BASOPHILS # BLD: 0.01 K/UL (ref 0–0.1)
BASOPHILS NFR BLD: 0.3 % (ref 0–1)
BUN SERPL-MCNC: 9 MG/DL (ref 6–20)
BUN/CREAT SERPL: 16 (ref 12–20)
CALCIUM SERPL-MCNC: 9 MG/DL (ref 8.5–10.1)
CEA SERPL-MCNC: 236 NG/ML
CHLORIDE SERPL-SCNC: 109 MMOL/L (ref 97–108)
CO2 SERPL-SCNC: 22 MMOL/L (ref 21–32)
CREAT SERPL-MCNC: 0.56 MG/DL (ref 0.55–1.02)
DIFFERENTIAL METHOD BLD: ABNORMAL
EOSINOPHIL # BLD: 0.02 K/UL (ref 0–0.4)
EOSINOPHIL NFR BLD: 0.5 % (ref 0–7)
ERYTHROCYTE [DISTWIDTH] IN BLOOD BY AUTOMATED COUNT: 17.2 % (ref 11.5–14.5)
GLUCOSE BLD STRIP.AUTO-MCNC: 124 MG/DL (ref 65–117)
GLUCOSE BLD STRIP.AUTO-MCNC: 150 MG/DL (ref 65–117)
GLUCOSE SERPL-MCNC: 110 MG/DL (ref 65–100)
HCT VFR BLD AUTO: 32.8 % (ref 35–47)
HGB BLD-MCNC: 9.8 G/DL (ref 11.5–16)
IMM GRANULOCYTES # BLD AUTO: 0.04 K/UL (ref 0–0.04)
IMM GRANULOCYTES NFR BLD AUTO: 1 % (ref 0–0.5)
LYMPHOCYTES # BLD: 1.01 K/UL (ref 0.8–3.5)
LYMPHOCYTES NFR BLD: 25.4 % (ref 12–49)
MCH RBC QN AUTO: 22.7 PG (ref 26–34)
MCHC RBC AUTO-ENTMCNC: 29.9 G/DL (ref 30–36.5)
MCV RBC AUTO: 76.1 FL (ref 80–99)
MONOCYTES # BLD: 0.67 K/UL (ref 0–1)
MONOCYTES NFR BLD: 16.9 % (ref 5–13)
NEUTS SEG # BLD: 2.22 K/UL (ref 1.8–8)
NEUTS SEG NFR BLD: 55.9 % (ref 32–75)
NRBC # BLD: 0 K/UL (ref 0–0.01)
NRBC BLD-RTO: 0 PER 100 WBC
PLATELET # BLD AUTO: 257 K/UL (ref 150–400)
PMV BLD AUTO: 10 FL (ref 8.9–12.9)
POTASSIUM SERPL-SCNC: 3.8 MMOL/L (ref 3.5–5.1)
RBC # BLD AUTO: 4.31 M/UL (ref 3.8–5.2)
SERVICE CMNT-IMP: ABNORMAL
SERVICE CMNT-IMP: ABNORMAL
SODIUM SERPL-SCNC: 138 MMOL/L (ref 136–145)
T4 FREE SERPL-MCNC: 1.8 NG/DL (ref 0.8–1.5)
WBC # BLD AUTO: 4 K/UL (ref 3.6–11)

## 2025-03-17 PROCEDURE — 2580000003 HC RX 258: Performed by: INTERNAL MEDICINE

## 2025-03-17 PROCEDURE — 2500000003 HC RX 250 WO HCPCS: Performed by: INTERNAL MEDICINE

## 2025-03-17 PROCEDURE — 94761 N-INVAS EAR/PLS OXIMETRY MLT: CPT

## 2025-03-17 PROCEDURE — 82962 GLUCOSE BLOOD TEST: CPT

## 2025-03-17 PROCEDURE — 87086 URINE CULTURE/COLONY COUNT: CPT

## 2025-03-17 PROCEDURE — 99497 ADVNCD CARE PLAN 30 MIN: CPT | Performed by: STUDENT IN AN ORGANIZED HEALTH CARE EDUCATION/TRAINING PROGRAM

## 2025-03-17 PROCEDURE — 85025 COMPLETE CBC W/AUTO DIFF WBC: CPT

## 2025-03-17 PROCEDURE — 99233 SBSQ HOSP IP/OBS HIGH 50: CPT | Performed by: INTERNAL MEDICINE

## 2025-03-17 PROCEDURE — 99223 1ST HOSP IP/OBS HIGH 75: CPT | Performed by: STUDENT IN AN ORGANIZED HEALTH CARE EDUCATION/TRAINING PROGRAM

## 2025-03-17 PROCEDURE — 6370000000 HC RX 637 (ALT 250 FOR IP): Performed by: STUDENT IN AN ORGANIZED HEALTH CARE EDUCATION/TRAINING PROGRAM

## 2025-03-17 PROCEDURE — 82378 CARCINOEMBRYONIC ANTIGEN: CPT

## 2025-03-17 PROCEDURE — 6370000000 HC RX 637 (ALT 250 FOR IP): Performed by: NURSE PRACTITIONER

## 2025-03-17 PROCEDURE — 80048 BASIC METABOLIC PNL TOTAL CA: CPT

## 2025-03-17 PROCEDURE — 84439 ASSAY OF FREE THYROXINE: CPT

## 2025-03-17 RX ORDER — OSELTAMIVIR PHOSPHATE 6 MG/ML
75 FOR SUSPENSION ORAL 2 TIMES DAILY
Qty: 120 ML | Refills: 0 | Status: SHIPPED | OUTPATIENT
Start: 2025-03-17 | End: 2025-03-21

## 2025-03-17 RX ORDER — NITROFURANTOIN 25; 75 MG/1; MG/1
100 CAPSULE ORAL EVERY 12 HOURS SCHEDULED
Qty: 7 CAPSULE | Refills: 0 | Status: SHIPPED | OUTPATIENT
Start: 2025-03-17 | End: 2025-03-21

## 2025-03-17 RX ORDER — OXYCODONE HYDROCHLORIDE 5 MG/1
5 TABLET ORAL EVERY 6 HOURS PRN
Qty: 20 TABLET | Refills: 0 | Status: SHIPPED | OUTPATIENT
Start: 2025-03-17 | End: 2025-03-22

## 2025-03-17 RX ADMIN — Medication 3 MG: at 00:47

## 2025-03-17 RX ADMIN — OSELTAMIVIR PHOSPHATE 75 MG: 6 POWDER, FOR SUSPENSION ORAL at 09:38

## 2025-03-17 RX ADMIN — NITROFURANTOIN MONOHYDRATE/MACROCRYSTALS 100 MG: 75; 25 CAPSULE ORAL at 09:38

## 2025-03-17 RX ADMIN — SODIUM CHLORIDE, PRESERVATIVE FREE 10 ML: 5 INJECTION INTRAVENOUS at 10:03

## 2025-03-17 RX ADMIN — FAMOTIDINE 20 MG: 10 INJECTION, SOLUTION INTRAVENOUS at 10:02

## 2025-03-17 NOTE — ACP (ADVANCE CARE PLANNING)
Advance Care Planning      Palliative Medicine Provider (MD/NP)  Advance Care Planning (ACP) Conversation      Date of Conversation: 03/17/25  The patient and/or authorized decision maker consented to a voluntary Advance Care Planning conversation.   Individuals present for the conversation:   Patient and Son Jaspal Crump    Legal Healthcare Agent(s):    Primary Decision Maker: Zita Recio - Child - [phone]    Primary Decision Maker: Jaspal Crump - Child - [phone]    ACP documents available in EMR prior to discussion:  -None    Primary Palliative Diagnosis(es):  Concern for Metastatic Colon Cancer  Influenza  UTI  Encephalopathy  Poor Appetite  Palliative Care Encounter    Conversation Summary:  Met with patient at bedside along with Palliative LCSW jovany Alexis visiting.  Introduced role of Palliative service and they are agreeable to visit.  Patient sitting in bedside chair and seems comfortable though not able to engage much in conversation beyond discussing symptoms, of which she has very little.  Does not recall conversations previous providers have had with her regarding suspected cancer.  Questionable capacity at this time which is likely impacted by acute flu and UTI.  Further conversation with pt's son/HOWIE Shay.  He reports they do not wish for further workup or evaluation as they do not believe surgery and/or chemotherapy would be in her best interest at this point in life.  Shares story of another family member who had cancer, received chemotherapy, and had very precipitous decline thereafter so this helps shape their perspective.  Reviewed support of Hospice and family is working on getting this support arranged.  Will meet with an agency once they get patient back to her ILF.  They understand Hospice is not in the home 24/7 so pt's children will take turns staying with her initially as they work on getting additional caregiver support.   Reviewed code status.  DNR.  DDNR  completed    Resuscitation Status:    Code Status: DNR    Outcomes / Completed Documentation:  An explanation of advance directives and their importance was provided and the following forms completed:    -Portable DNR    If new document completed, original was provided to patient and/or family member.    Copy was placed for scanning into the [facility] EMR.      I spent 16 minutes providing separately identifiable ACP services with the patient and/or surrogate decision maker in a voluntary, in-person conversation discussing the patient's wishes and goals as detailed in the above note.       Randy Shelley MD

## 2025-03-17 NOTE — PROGRESS NOTES
Nurse handed patient a copy of discharge instruction which have been read and explained to patient along with patient's daughter and son. New medications reviewed. Patient and family verbalized understanding. Patient and family aware that prescriptions have been electronically sent to pharmacy. Opportunity for questions and clarification offered. Removed patient's IV access with no complications, vital signs stable, and patient sent with belongings. Patient escorted downstairs by PCT using patient's personal wheelchair.

## 2025-03-17 NOTE — CARE COORDINATION
3/17/2025  1:58 PM      Care Management Initial Assessment  3/17/2025 1:58 PM  If patient is discharged prior to next notation, then this note serves as note for discharge by case management.    Reason for Admission:   Acute diarrhea [R19.7]  General weakness [R53.1]  Diarrhea, unspecified type [R19.7]         Patient Admission Status: Inpatient  Date Admitted to INP: 3/15/25  RUR: Readmission Risk Score: 14.3    Hospitalization in the last 30 days (Readmission):  No        Advance Care Planning:  Code Status: DNR  Primary Healthcare Decision Maker: (P) Legal Next of Kin  Primary Decision Maker: Zita Recio - Child - [phone]   Advance Directive: has an advanced directive - a copy HAS NOT been provided.     __________________________________________________________________________  Assessment:      03/17/25 1352   Service Assessment   Patient Orientation Unable to Assess   Cognition Alert   History Provided By Child/Family  (son Jaspal Crump  (C) [phone])   Primary Caregiver Self   Support Systems Children   Patient's Healthcare Decision Maker is: Legal Next of Kin   PCP Verified by CM Yes  (VIDAL Jennings)   Last Visit to PCP Within last 3 months   Prior Functional Level Independent in ADLs/IADLs;Assistance with the following:;Shopping;Housework;Cooking;Mobility  (RW for mobility)   Current Functional Level Assistance with the following:;Bathing;Dressing;Cooking;Housework;Shopping;Mobility  (RW for mobility)   Can patient return to prior living arrangement Yes   Ability to make needs known: Good   Family able to assist with home care needs: Yes   Financial Resources Medicare;Other (Comment)  (Medicare A/B, [employer] Supplement)   Community Resources None   Social/Functional History   Lives With Alone   Type of Home Apartment  (Independent living)   Home Layout One level   Home Access Level entry   Bathroom Shower/Tub Shower chair with back;Walk-in shower   Bathroom Toilet Handicap height   Home  [facility] [address]   Phone number: [phone]    Pharmacy:   [facility] - [place], [place] - [address] [address] - P [phone] - F [phone]  [address] [facility] [address]  Phone: [phone] Fax: [phone]    DC Transport: (P) Family       Transition of care plan:      [x] Home with Hospice   - [place] of Choice offered? [x] Yes, Preference:  [facility]  [] NA          Atif Norton  Case Management Department  For questions or concerns, please PerfectServe

## 2025-03-17 NOTE — PROGRESS NOTES
Palliative Medicine      Code Status: DNR    Advance Care Planning:    Primary Decision Maker: Zita Recio - Child - [phone]    Primary Decision Maker: Jaspal Crump - Child - [phone]    Pt reportedly has AMD in place which appoints dtr Zita Recio and son Jaspal Crump as Medical POAs; copy is not on file.  In absence of verified Medical POA, pt's four adult children would share decision making authority if pt is unable to speak for herself.  DDNR was reviewed and completed today in anticipation of discharge.  Copy has been scanned into medical record; original and copy were returned to son.     Patient / Family Encounter Documentation    Participants (names): Pt, son Jaspal, Palliative Medicine (Dr. Shelley, [facility])    Narrative: Pt was sitting up in chair, engaged in conversation but is very hard of hearing in addition to having vision impairment.  Son denied pt having history of cognitive impairment but corrected much of what pt shared today.  Pt did not demonstrate capacity to make complex medical decisions at this time without support from family.  Pt lives at [facility];  is .  Pt has 3 dtrs and 1 son, all but 1 dtr are local.  Pt recently returned home from a cruise with dtr Zita, did well on her trip but developed symptoms soon after, has since been told of probable cancer diagnosis.  Pt/family have elected not to pursue further workup/treatment, are in the process of arranging hospice support.     Psychosocial Issues Identified/ Resilience Factors: Pt has good support in place from her children, particularly Zita and Jaspal.  Pt expressed concern for those who aren't guided by their kavya, shared that she talks to god every day.  Chaplains are available for support as needed.      Caregiver [place]: Low  Does the caregiver feel confident administering medication? No concerns expressed  Does the caregiver need any help connecting with

## 2025-03-17 NOTE — PROGRESS NOTES
0812 nurse made Dr. Dennis aware that patient's hr is running in the 120's this am. Nurse made MD aware that patient is not on tele. Did you want to order anything this am.    0813 per Dr. Dennis please get EKG and put on telemetry.     0902 nurse made MD aware of patient's EKG results.    0913 no new orders at this time.

## 2025-03-17 NOTE — DISCHARGE SUMMARY
Patient ID:  Rhea Bennett  [id number]  89 y.o.  1935    Admit date: 3/15/2025    Discharge date and time: 3/17/2025    Admission Diagnoses: Acute diarrhea [R19.7]  General weakness [R53.1]  Diarrhea, unspecified type [R19.7]    Discharge Diagnoses:    Principal Problem:    Acute diarrhea, colon cancer, metastatic  Active Problems:    PAF (paroxysmal atrial fibrillation) (HCC)    Hypercholesteremia    DM (diabetes mellitus), type 2 (HCC)    HTN (hypertension), benign    Hypokalemia due to loss of potassium    GERD (gastroesophageal reflux disease)    Microcytic anemia  Resolved Problems:    * No resolved hospital problems. *       RECOMMENDATIONS FOR PCP:   Consider restarting BP meds if appropriate.     Admission HPI:  Ms. Bennett is a 89 y.o. female who is being admitted for Acute diarrhea. Ms. Bennett presented to our Emergency Department today complaining of  a now persistent diarrhea for the past 3-4 days. Her daughter tells me they went for a cruise to the [facility] and returned here a week ago. Her symptoms started about three days later. Symptoms did not improve with oral hydration and were associated with generalized weakness and a decreased appetite. Unclear if her stools had any blood in them. In the ED, she was found to be hypokalemia and dehydrated. She will be admitted for further management. She is not a good historian and as such, I have discussed with her daughter for collaborative hx.     Hospital Course by problem addressed:  Ms. Bennett is a 89 y.o. female who is being admitted for Acute diarrhea. Ms. Bennett presented to our Emergency Department today complaining of  a now persistent diarrhea for the past 3-4 days. Her daughter mentioned they went for a cruise to the [facility] and returned here a week ago. Her symptoms started about three days later.  She is admitted for further evaluation and management.           Metastatic disease:  likely colonic primary cancer with pulmonary mets.  CT abdomen pelvis shows

## 2025-03-17 NOTE — PLAN OF CARE
Problem: Discharge Planning  Goal: Discharge to home or other facility with appropriate resources  3/17/2025 1449 by Merari Colunga, RN  Outcome: HH/HSPC Resolved Met  Flowsheets (Taken 3/17/2025 0900)  Discharge to home or other facility with appropriate resources:   Identify barriers to discharge with patient and caregiver   Arrange for needed discharge resources and transportation as appropriate  3/17/2025 0520 by Rhea Mayorga, RN  Outcome: Progressing

## 2025-03-17 NOTE — PALLIATIVE CARE DISCHARGE
Goals of Care/Treatment Preferences    The Palliative Medicine team was consulted as part of your/your loved one's care in the hospital. Our team is a supportive service; we strive to relieve suffering and improve quality of life.    We reviewed advance care planning information, which includes the following:    Primary Decision Maker: Zita Recio - Child - [phone]    Primary Decision Maker: Jaspal Crump - Child - [phone]  Patient's Healthcare Decision Maker is:: Legal Next of Kin  Confirm Advance Directive: Yes, not on file  Does the patient have other document types?: Do Not Resuscitate, Power of          We reviewed / discussed your code status as:   Code Status: DNR     “Full Code” means perform CPR in the event of cardiac arrest.      “DNR” means do NOT perform CPR in the event of cardiac arrest.      “Partial Code” means you have specific preferences, please discuss with your healthcare team.      “No Order” means this issue was not addressed / resolved during your stay       Other Instructions: You will be returning home with hospice support.    You have a Durable Do Not Resuscitate Order in place, which should travel with you.  When you are in a facility, this form should be placed on your chart. Once you are home, it is recommended that the DDNR form be placed in a visible location such as on the refrigerator or bedroom door.         Because of the importance of this information, we are providing you with a printed copy to share with other healthcare providers after this hospitalization is complete.

## 2025-03-17 NOTE — CONSULTS
Palliative Medicine  Patient Name: Rhea Bennett  YOB: 1935  MRN: [id number]  Age: 89 y.o.  Gender: female    Date of Initial Consult: 3/15/2025  Date of Service: 3/17/2025  Time: 2:41 PM  Provider: Randy Shelley MD  Hospital Day: 3  Admit Date: 3/15/2025  Referring Provider: Katelyn Horta MD    Reasons for Consultation:  Other: metastatic disease    HISTORY OF PRESENT ILLNESS (HPI):   Rhea Bennett is a 89 y.o. female with a past medical history of atrial fibrillation, HTN, DM2, GERD who was admitted on 3/15/2025 from home with acute diarrhea and cough.  Subsequently found with a diagnosis of influenza, UTI, suspected colon cancer with lung mets.  She had apparently been with family on a cruise, returning 1 week ago, and developed diarrhea and cough approximately 3 days after returning.  Findings this admission with CXR concerning for lung lesions.  Follow-up CT C/A/P with contrast showing apple core lesion of ascending colon and numerous bilateral pulmonary lesions with left pleural effusion.  Oncology was consulted and discussed with family that these findings are concerning for metastatic colon cancer.  Being treated for flu with tamiflu and UTI with macrobid.    Psychosocial:     4 children: Son Jaspal, daughter Zita and 2 other daughters - 1 daughter in [place], rest in [facility].  Lives at [facility].    PALLIATIVE DIAGNOSES:    Concern for Metastatic Colon Cancer  Influenza  UTI  Encephalopathy  Poor Appetite  Palliative Care Encounter    ASSESSMENT AND PLAN:   Today, I am treating Rhea Bennett for symptom management and goals of care related to suspected metastatic disease which is in severe progression as described in the note.  I reviewed records from the following unique sources (outside institutions or providers from different services): ED, Hospitalist, and Oncology physicians.    Discussed with Oncology team, BOBBI Becker.  Assessment required an independent historian, so additional  includes time spent prior to the visit and after the visit in direct care of the patient. This time does not include time spent in any separately reportable services.    Electronically signed by   Randy Shelley MD  Palliative Care Team  on 3/17/2025 at 2:41 PM

## 2025-03-17 NOTE — CONSULTS
[facility] at [facility]  [address], [address] [facility] [address]  W: [phone]  F: [phone]      Reason for Visit:   Rhea Bennett is a 89 y.o. female who is seen today for evaluation of colon cancer with lung metastases.    Hematology/Oncology Treatment History:   CXR 3/16/2025: Multiple bilateral pulmonary masses suspicious for metastatic disease.  CT C/A/P 3/16/2025: Extensive pulmonary metastases with small left pleural effusion. Apple core lesion of the ascending colon measuring 4.7 x 4.6 cm with surrounding lymphadenopathy in the ascending colonic mesentery suggestive of primary colon neoplasm. Thickened endometrium measuring 22 mm, abnormal for a postmenopausal female.      Interval History:   She is unhappy, wants to leave the hospital. Denies pain. Son at bedside.      Review of systems was obtained and pertinent findings reviewed above. Past medical history, social history, family history, medications, and allergies are located in the electronic medical record.    Physical Exam:   Visit Vitals  /76   Pulse (!) 118   Temp 97.3 °F (36.3 °C) (Axillary)   Resp 18   Ht 1.6 m (5' 3\")   Wt 111.4 kg (245 lb 9.5 oz)   SpO2 94%   BMI 43.50 kg/m²     General: no distress, elderly and frail appearing  Respiratory: normal respiratory effort  CV: no peripheral edema  Skin: no rashes; no ecchymoses; no petechiae      Results:     Lab Results   Component Value Date    WBC 4.0 03/17/2025    HGB 9.8 (L) 03/17/2025    HCT 32.8 (L) 03/17/2025     03/17/2025    MCV 76.1 (L) 03/17/2025    NEUTROABS 2.22 03/17/2025     Lab Results   Component Value Date     03/17/2025    K 3.8 03/17/2025     (H) 03/17/2025    CO2 22 03/17/2025    GLUCOSE 110 (H) 03/17/2025    BUN 9 03/17/2025    CREATININE 0.56 03/17/2025    LABGLOM 88 03/17/2025    CALCIUM 9.0 03/17/2025    MG 1.2 (L) 03/15/2025    PHOS 2.0 (L) 03/16/2025     Lab Results   Component Value Date    BILITOT 0.2

## 2025-03-17 NOTE — DISCHARGE INSTRUCTIONS
HOSPITALIST DISCHARGE INSTRUCTIONS  NAME:  Rhea Bennett   :  1935   MRN:  [id number]     Date/Time:  3/17/2025 1:02 PM    ADMIT DATE: 3/15/2025     DISCHARGE DATE: 3/17/2025     DISCHARGE DIAGNOSIS:  ***    DISCHARGE INSTRUCTIONS:  Thank you for allowing us to participate in your care. Your discharging Hospitalist is Brandon Dennis MD. You were admitted for evaluation and treatment of the above. ***    --Please give a copy of this document to your primary care provider.  Please follow up with them within one week.       MEDICATIONS:    It is important that you take the medication exactly as they are prescribed.   Keep your medication in the bottles provided by the pharmacist and keep a list of the medication names, dosages, and times to be taken in your wallet.   Do not take other medications without consulting your doctor.             If you experience any of the following symptoms then please call your primary care physician or return to the emergency room if you cannot get hold of your doctor:  Fever, chills, nausea, vomiting, diarrhea, change in mentation, falling, bleeding, shortness of breath    Follow Up:  Please call the below provider to arrange hospital follow up appointment      Roseann Nino MD  [address] [address] [phone]    Schedule an appointment as soon as possible for a visit          Information obtained by :  I understand that if any problems occur once I am at home I am to contact my physician.    I understand and acknowledge receipt of the instructions indicated above.                                                                                                                                           Physician's or R.N.'s Signature                                                                  Date/Time

## 2025-03-18 LAB
BACTERIA SPEC CULT: NORMAL
CC UR VC: NORMAL
SERVICE CMNT-IMP: NORMAL

## 2025-03-28 NOTE — PROGRESS NOTES
Physician Progress Note      PATIENT:               ES BYRD  [facility] #:                  [id number]  :                       1935  ADMIT DATE:       3/15/2025 7:38 PM  DISCH DATE:        3/17/2025 3:25 PM  RESPONDING  PROVIDER #:        Brandon Dennis MD          QUERY TEXT:    Patient admitted with metastatic disease. Noted documentation of UTI in   discharge summary In order to support the diagnosis of UTI, please include   additional clinical indicators in your documentation.  Or please document if   the diagnosis of UTI has been ruled out after further study.    The medical record reflects the following:  Risk Factors: Acute illness  Clinical Indicators: 3/17 Urine Culture:  MIXED UROGENITAL ANALILIA ISOLATED  3/15 UA: Large blood, trace Leukocyte Esterase, 3+ bacteria; WBC 10-20    Treatment: DC-Complete 5 day course of macrobid.  Options provided:  -- UTI present as evidenced by, Please document evidence.  -- Cystitis  -- UTI was ruled out  -- Other - I will add my own diagnosis  -- Disagree - Not applicable / Not valid  -- Disagree - Clinically unable to determine / Unknown  -- Refer to Clinical Documentation Reviewer    PROVIDER RESPONSE TEXT:    This patient has cystitis.    Query created by: Maritza Hernandez on 3/26/2025 10:24 AM      QUERY TEXT:    Patient admitted with BMI 43.50. If possible, please document in progress   notes and discharge summary if you are evaluating and /or treating any of the   following:    The medical record reflects the following:  Risk Factors: DM2  Clinical Indicators: BMI 43.50 kg/m  Treatment: IVF; labs    Specificity of obesity and morbid obesity should be reported based on   physician documentation, as there are several published classifications and   definitions? 3M MS-DRG Training Guide. CDC:   https://www.cdc.gov/obesity/basics/adult-defining.html. WHO:   https://www.who.int/news-room/fact-sheets/detail/obesity-and-overweight. NIH: